# Patient Record
Sex: MALE | Race: WHITE | NOT HISPANIC OR LATINO | Employment: FULL TIME | URBAN - METROPOLITAN AREA
[De-identification: names, ages, dates, MRNs, and addresses within clinical notes are randomized per-mention and may not be internally consistent; named-entity substitution may affect disease eponyms.]

---

## 2017-03-14 ENCOUNTER — OFFICE VISIT (OUTPATIENT)
Dept: FAMILY MEDICINE | Facility: CLINIC | Age: 32
End: 2017-03-14
Payer: COMMERCIAL

## 2017-03-14 VITALS
RESPIRATION RATE: 16 BRPM | OXYGEN SATURATION: 97 % | HEIGHT: 71 IN | TEMPERATURE: 97.9 F | HEART RATE: 70 BPM | WEIGHT: 263 LBS | SYSTOLIC BLOOD PRESSURE: 138 MMHG | DIASTOLIC BLOOD PRESSURE: 88 MMHG | BODY MASS INDEX: 36.82 KG/M2

## 2017-03-14 DIAGNOSIS — L72.3 INFECTED SEBACEOUS CYST: ICD-10-CM

## 2017-03-14 DIAGNOSIS — Z00.01 ENCOUNTER FOR ROUTINE ADULT MEDICAL EXAM WITH ABNORMAL FINDINGS: Primary | ICD-10-CM

## 2017-03-14 DIAGNOSIS — Z13.1 SCREENING FOR DIABETES MELLITUS: ICD-10-CM

## 2017-03-14 DIAGNOSIS — Z23 IMMUNIZATION DUE: ICD-10-CM

## 2017-03-14 DIAGNOSIS — L08.9 INFECTED SEBACEOUS CYST: ICD-10-CM

## 2017-03-14 DIAGNOSIS — E78.5 HYPERLIPIDEMIA LDL GOAL <160: ICD-10-CM

## 2017-03-14 PROBLEM — E66.9 NON MORBID OBESITY, UNSPECIFIED OBESITY TYPE: Status: ACTIVE | Noted: 2017-03-14

## 2017-03-14 LAB
CHOLEST SERPL-MCNC: 194 MG/DL
GLUCOSE SERPL-MCNC: 94 MG/DL (ref 70–99)
HDLC SERPL-MCNC: 53 MG/DL
LDLC SERPL CALC-MCNC: 115 MG/DL
NONHDLC SERPL-MCNC: 141 MG/DL
TRIGL SERPL-MCNC: 130 MG/DL

## 2017-03-14 PROCEDURE — 90471 IMMUNIZATION ADMIN: CPT | Performed by: PHYSICIAN ASSISTANT

## 2017-03-14 PROCEDURE — 36415 COLL VENOUS BLD VENIPUNCTURE: CPT | Performed by: PHYSICIAN ASSISTANT

## 2017-03-14 PROCEDURE — 99213 OFFICE O/P EST LOW 20 MIN: CPT | Mod: 25 | Performed by: PHYSICIAN ASSISTANT

## 2017-03-14 PROCEDURE — 99395 PREV VISIT EST AGE 18-39: CPT | Mod: 25 | Performed by: PHYSICIAN ASSISTANT

## 2017-03-14 PROCEDURE — 80061 LIPID PANEL: CPT | Performed by: PHYSICIAN ASSISTANT

## 2017-03-14 PROCEDURE — 82947 ASSAY GLUCOSE BLOOD QUANT: CPT | Performed by: PHYSICIAN ASSISTANT

## 2017-03-14 PROCEDURE — 90636 HEP A/HEP B VACC ADULT IM: CPT | Performed by: PHYSICIAN ASSISTANT

## 2017-03-14 PROCEDURE — 10060 I&D ABSCESS SIMPLE/SINGLE: CPT | Performed by: PHYSICIAN ASSISTANT

## 2017-03-14 RX ORDER — CEPHALEXIN 500 MG/1
500 CAPSULE ORAL 3 TIMES DAILY
Qty: 30 CAPSULE | Refills: 0 | Status: SHIPPED | OUTPATIENT
Start: 2017-03-14 | End: 2017-03-21

## 2017-03-14 ASSESSMENT — PAIN SCALES - GENERAL: PAINLEVEL: NO PAIN (0)

## 2017-03-14 NOTE — LETTER
[facility]  [address] [place] [address]  [phone]        March 27, 2017      Noé Knight  [address]  [place] [address]        Dear Noé,      Your recent lipid profile came back within normal limits. Continue to work on a Lower fat, higher fiber diet and consistent exercise.   Your blood sugar also came back normal.     Results for orders placed or performed in visit on 03/14/17   LIPID REFLEX TO DIRECT LDL PANEL   Result Value Ref Range    Cholesterol 194 <200 mg/dL    Triglycerides 130 <150 mg/dL    HDL Cholesterol 53 >39 mg/dL    LDL Cholesterol Calculated 115 (H) <100 mg/dL    Non HDL Cholesterol 141 (H) <130 mg/dL   Glucose   Result Value Ref Range    Glucose 94 70 - 99 mg/dL     If you have any questions or concerns, please call myself or my nurse at [phone].    Sincerely,    Olivier Shea PA-C/sheela

## 2017-03-14 NOTE — PROGRESS NOTES
SUBJECTIVE:     CC: Noé Knight is an 31 year old male who presents for preventative health visit.     Healthy Habits:    Do you get at least three servings of calcium containing foods daily (dairy, green leafy vegetables, etc.)? yes    Amount of exercise or daily activities, outside of work: 3-5 day(s) per week    Problems taking medications regularly not applicable    Medication side effects: No    Have you had an eye exam in the past two years? no    Do you see a dentist twice per year? yes    Do you have sleep apnea, excessive snoring or daytime drowsiness?no      Concerns: blood pressure elevated today.  Possible infected cyst on back.       Today's PHQ-2 Score:   PHQ-2 ( 1999 Pfizer) 3/14/2017 12/18/2015   Q1: Little interest or pleasure in doing things 0 0   Q2: Feeling down, depressed or hopeless 0 0   PHQ-2 Score 0 0       Abuse: Current or Past(Physical, Sexual or Emotional)- No  Do you feel safe in your environment - Yes    Social History   Substance Use Topics     Smoking status: Never Smoker     Smokeless tobacco: Not on file     Alcohol use 0.0 oz/week     0 Standard drinks or equivalent per week      Comment: social use only     The patient does not drink >3 drinks per day nor >7 drinks per week.    Last PSA: No results found for: PSA    Recent Labs   Lab Test  12/18/15   0828   CHOL  193   HDL  48   LDL  124*   TRIG  106   NHDL  145*       Reviewed orders with patient. Reviewed health maintenance and updated orders accordingly - Yes    Reviewed and updated as needed this visit by clinical staff  Tobacco  Allergies  Meds  Problems  Med Hx  Surg Hx  Fam Hx  Soc Hx          Reviewed and updated as needed this visit by Provider  Tobacco  Allergies  Meds  Problems  Med Hx  Surg Hx  Fam Hx  Soc Hx         Past Medical History   Diagnosis Date     Esophageal stricture      endoscopy with bougie dilatation     Seasonal allergies       Past Surgical History   Procedure Laterality Date  "    [place] teeth       Open reduction internal fixation rodding intramedullar femur fracture table         ROS:  C: NEGATIVE for fever, chills, change in weight  I: NEGATIVE for worrisome rashes, moles or lesions  E: NEGATIVE for vision changes or irritation  ENT: NEGATIVE for ear, mouth and throat problems  R: NEGATIVE for significant cough or SOB  CV: NEGATIVE for chest pain, palpitations or peripheral edema  GI: NEGATIVE for nausea, abdominal pain, heartburn, or change in bowel habits   male: negative for dysuria, hematuria, decreased urinary stream, erectile dysfunction, urethral discharge  M: NEGATIVE for significant arthralgias or myalgia  N: NEGATIVE for weakness, dizziness or paresthesias  P: NEGATIVE for changes in mood or affect    Problem list, Medication list, Allergies, and Medical/Social/Surgical histories reviewed in [facility] and updated as appropriate.  BP Readings from Last 3 Encounters:   03/14/17 138/88   12/18/15 136/86    Wt Readings from Last 3 Encounters:   03/14/17 263 lb (119.3 kg)   12/18/15 262 lb (118.8 kg)                  OBJECTIVE:     /88 (BP Location: Left arm, Patient Position: Chair, Cuff Size: Adult Large)  Pulse 70  Temp 97.9  F (36.6  C) (Tympanic)  Resp 16  Ht 5' 11\" (1.803 m)  Wt 263 lb (119.3 kg)  SpO2 97%  BMI 36.68 kg/m2  EXAM:  GENERAL: healthy, alert and no distress  EYES: Eyes grossly normal to inspection, PERRL and conjunctivae and sclerae normal  HENT: ear canals and TM's normal, nose and mouth without ulcers or lesions  NECK: no adenopathy, no asymmetry, masses, or scars and thyroid normal to palpation  RESP: lungs clear to auscultation - no rales, rhonchi or wheezes  CV: regular rate and rhythm, normal S1 S2, no S3 or S4, no murmur, click or rub, no peripheral edema and peripheral pulses strong  ABDOMEN: soft, nontender, no hepatosplenomegaly, no masses and bowel sounds normal  MS: no gross musculoskeletal defects noted, no edema  SKIN: no suspicious lesions or " "rashes  NEURO: Normal strength and tone, mentation intact and speech normal  PSYCH: mentation appears normal, affect normal/bright  3cm x 2.5cm sebaceous cyst on mid back. Firm to slightly fluctuant. Redness and tenderness noted .  After consent was obtained, using sterile technique the overlying skin was prepped and 1% Lidocaine was used as local anesthetic. Using an #11 blade, a 1cm incision was made producing a moderate amount of purulent material. A curved forceps was used to break up loculations.  The abscess space was then irrigated until clear with normal saline. Area was bandaged. Discussed aftercare    ASSESSMENT/PLAN:         ICD-10-CM    1. Encounter for routine adult medical exam with abnormal findings Z00.01    2. Hyperlipidemia LDL goal <160 E78.5 LIPID REFLEX TO DIRECT LDL PANEL   3. Immunization due Z23 HEPA/HEPB VACCINE ADULT IM   4. Screening for diabetes mellitus Z13.1 Glucose   5. Infected sebaceous cyst L72.3 DRAIN SKIN ABSCESS SIMPLE/SINGLE    L08.9 cephALEXin (KEFLEX) 500 MG capsule       COUNSELING:  Reviewed preventive health counseling, as reflected in patient instructions       Regular exercise       Healthy diet/nutrition       reports that he has never smoked. He does not have any smokeless tobacco history on file.    Estimated body mass index is 36.68 kg/(m^2) as calculated from the following:    Height as of this encounter: 5' 11\" (1.803 m).    Weight as of this encounter: 263 lb (119.3 kg).   Weight management plan: Discussed healthy diet and exercise guidelines and patient will follow up in 12 months in clinic to re-evaluate.    Counseling Resources:  ATP IV Guidelines  Pooled Cohorts Equation Calculator  FRAX Risk Assessment  ICSI Preventive Guidelines  Dietary Guidelines for Americans, 2010  USDA's MyPlate  ASA Prophylaxis  Lung CA Screening    Olivier Shea PA-C  [facility] [place]  "

## 2017-03-14 NOTE — MR AVS SNAPSHOT
After Visit Summary   3/14/2017    Noé Knight    MRN: [id number]           Patient Information     Date Of Birth          1985        Visit Information        Provider Department      3/14/2017 7:40 AM Olivier Shea PA-C [facility] [place]        Today's Diagnoses     Encounter for routine adult medical exam with abnormal findings    -  1    Hyperlipidemia LDL goal <160        Immunization due        Screening for diabetes mellitus        Infected sebaceous cyst          Care Instructions      Preventive Health Recommendations  Male Ages 26 - 39    Yearly exam:             See your health care provider every year in order to  o   Review health changes.   o   Discuss preventive care.    o   Review your medicines if your doctor has prescribed any.    You should be tested each year for STDs (sexually transmitted diseases), if you re at risk.     After age 35, talk to your provider about cholesterol testing. If you are at risk for heart disease, have your cholesterol tested at least every 5 years.     If you are at risk for diabetes, you should have a diabetes test (fasting glucose).  Shots: Get a flu shot each year. Get a tetanus shot every 10 years.     Nutrition:    Eat at least 5 servings of fruits and vegetables daily.     Eat whole-grain bread, whole-wheat pasta and brown rice instead of white grains and rice.     Talk to your provider about Calcium and Vitamin D.     Lifestyle    Exercise for at least 150 minutes a week (30 minutes a day, 5 days a week). This will help you control your weight and prevent disease.     Limit alcohol to one drink per day.     No smoking.     Wear sunscreen to prevent skin cancer.     See your dentist every six months for an exam and cleaning.           Follow-ups after your visit        Your next 10 appointments already scheduled     Mar 21, 2017 10:20 AM CDT   Office Visit with FLAQUITO Modi ([facility]  "[place]    [address]  [place] [address] [phone]           Bring a current list of meds and any records pertaining to this visit.  For Physicals, please bring immunization records and any forms needing to be filled out.  Please arrive 10 minutes early to complete paperwork.              Who to contact     Normal or non-critical lab and imaging results will be communicated to you by [email or web address], letter or phone within 4 business days after the clinic has received the results. If you do not hear from us within 7 days, please contact the clinic through [email or web address] or phone. If you have a critical or abnormal lab result, we will notify you by phone as soon as possible.  Submit refill requests through [employer] or call your pharmacy and they will forward the refill request to us. Please allow 3 business days for your refill to be completed.          If you need to speak with a  for additional information , please call: [phone]             Additional Information About Your Visit        [employer] Information     [employer] lets you send messages to your doctor, view your test results, renew your prescriptions, schedule appointments and more. To sign up, go to [email or web address] . Click on \"Log in\" on the left side of the screen, which will take you to the Welcome page. Then click on \"Sign up Now\" on the right side of the page.     You will be asked to enter the access code listed below, as well as some personal information. Please follow the directions to create your username and password.     Your access code is: [email or web address]  Expires: 2017  6:43 AM     Your access code will  in 90 days. If you need help or a new code, please call your [place] clinic or [phone].        Care EveryWhere ID     This is your Care EveryWhere ID. This could be used by other organizations to access your [place] medical records  [id number]        Your Vitals Were     Pulse Temperature " "Respirations Height Pulse Oximetry BMI (Body Mass Index)    70 97.9  F (36.6  C) (Tympanic) 16 5' 11\" (1.803 m) 97% 36.68 kg/m2       Blood Pressure from Last 3 Encounters:   03/14/17 138/88   12/18/15 136/86    Weight from Last 3 Encounters:   03/14/17 263 lb (119.3 kg)   12/18/15 262 lb (118.8 kg)              We Performed the Following     DRAIN SKIN ABSCESS SIMPLE/SINGLE     Glucose     HEPA/HEPB VACCINE ADULT IM     LIPID REFLEX TO DIRECT LDL PANEL          Today's Medication Changes          These changes are accurate as of: 3/14/17 11:59 PM.  If you have any questions, ask your nurse or doctor.               Start taking these medicines.        Dose/Directions    cephALEXin 500 MG capsule   Commonly known as:  KEFLEX   Used for:  Infected sebaceous cyst   Started by:  Olivier Shea PA-C        Dose:  500 mg   Take 1 capsule (500 mg) by mouth 3 times daily   Quantity:  30 capsule   Refills:  0            Where to get your medicines      These medications were sent to [facility] [place]  [place]  [address] [address] [address]     Phone:  [phone]     cephALEXin 500 MG capsule                Primary Care Provider Office Phone #    [facility] [phone]       [facility] [id number] [place] [address] [address]  [place] [address]        Thank you!     Thank you for choosing [facility]  for your care. Our goal is always to provide you with excellent care. Hearing back from our patients is one way we can continue to improve our services. Please take a few minutes to complete the written survey that you may receive in the mail after your visit with us. Thank you!             Your Updated Medication List - Protect others around you: Learn how to safely use, store and throw away your medicines at www.disposemymeds.org.          This list is accurate as of: 3/14/17 11:59 PM.  Always use your most recent med list.                   Brand Name Dispense " Instructions for use    cephALEXin 500 MG capsule    KEFLEX    30 capsule    Take 1 capsule (500 mg) by mouth 3 times daily

## 2017-03-21 ENCOUNTER — OFFICE VISIT (OUTPATIENT)
Dept: FAMILY MEDICINE | Facility: CLINIC | Age: 32
End: 2017-03-21
Payer: COMMERCIAL

## 2017-03-21 VITALS
BODY MASS INDEX: 36.82 KG/M2 | WEIGHT: 263 LBS | HEART RATE: 65 BPM | HEIGHT: 71 IN | DIASTOLIC BLOOD PRESSURE: 84 MMHG | SYSTOLIC BLOOD PRESSURE: 136 MMHG

## 2017-03-21 DIAGNOSIS — L91.8 SKIN TAG: Primary | ICD-10-CM

## 2017-03-21 PROCEDURE — 11200 RMVL SKIN TAGS UP TO&INC 15: CPT | Performed by: PHYSICIAN ASSISTANT

## 2017-03-21 NOTE — MR AVS SNAPSHOT
"              After Visit Summary   3/21/2017    Noé Knight    MRN: [id number]           Patient Information     Date Of Birth          1985        Visit Information        Provider Department      3/21/2017 10:20 AM Olivier Shea PA-C [facility] [place]        Today's Diagnoses     Skin tag    -  1       Follow-ups after your visit        Who to contact     Normal or non-critical lab and imaging results will be communicated to you by Pocketbookhart, letter or phone within 4 business days after the clinic has received the results. If you do not hear from us within 7 days, please contact the clinic through MyChart or phone. If you have a critical or abnormal lab result, we will notify you by phone as soon as possible.  Submit refill requests through [employer] or call your pharmacy and they will forward the refill request to us. Please allow 3 business days for your refill to be completed.          If you need to speak with a  for additional information , please call: [phone]             Additional Information About Your Visit        Qulsar Information     Qulsar lets you send messages to your doctor, view your test results, renew your prescriptions, schedule appointments and more. To sign up, go to [email or web address] . Click on \"Log in\" on the left side of the screen, which will take you to the Welcome page. Then click on \"Sign up Now\" on the right side of the page.     You will be asked to enter the access code listed below, as well as some personal information. Please follow the directions to create your username and password.     Your access code is: [email or web address]  Expires: 2017  6:43 AM     Your access code will  in 90 days. If you need help or a new code, please call your [place] clinic or [phone].        Care EveryWhere ID     This is your Care EveryWhere ID. This could be used by other organizations to access your [place] medical " "records  [id number]        Your Vitals Were     Pulse Height BMI (Body Mass Index)             65 5' 11\" (1.803 m) 36.68 kg/m2          Blood Pressure from Last 3 Encounters:   03/21/17 136/84   03/14/17 138/88   12/18/15 136/86    Weight from Last 3 Encounters:   03/21/17 263 lb (119.3 kg)   03/14/17 263 lb (119.3 kg)   12/18/15 262 lb (118.8 kg)              We Performed the Following     REMOVAL OF SKIN TAGS, FIRST 15          Today's Medication Changes          These changes are accurate as of: 3/21/17 11:59 PM.  If you have any questions, ask your nurse or doctor.               Stop taking these medicines if you haven't already. Please contact your care team if you have questions.     cephALEXin 500 MG capsule   Commonly known as:  KEFLEX   Stopped by:  Olivier Shea PA-C                    Primary Care Provider Office Phone # Fax #    Olivier Shea PA-C [phone]       [facility] [address] [facility] [address]        Thank you!     Thank you for choosing [facility]  for your care. Our goal is always to provide you with excellent care. Hearing back from our patients is one way we can continue to improve our services. Please take a few minutes to complete the written survey that you may receive in the mail after your visit with us. Thank you!             Your Updated Medication List - Protect others around you: Learn how to safely use, store and throw away your medicines at www.disposemymeds.org.      Notice  As of 3/21/2017 11:59 PM    You have not been prescribed any medications.      "

## 2017-03-21 NOTE — PROGRESS NOTES
SUBJECTIVE:                                                    Noé Knight is a 31 year old male who presents to clinic today for the following health issues:      Lesion removal-noted near groin area. No bleeding or peeling noted        Problem list and histories reviewed & adjusted, as indicated.  Additional history: as documented        Reviewed and updated as needed this visit by clinical staff  Tobacco  Allergies  Meds  Problems  Med Hx  Surg Hx  Fam Hx  Soc Hx        Reviewed and updated as needed this visit by Provider         All other systems negative except as outline above  OBJECTIVE:  Papillomatous skin tag noted perineal area.   Skin tags are snipped off using alcohol for cleansing and sterile iris scissors. Local anesthesia was used. These pathognomonic lesions are not sent for pathology. Site of excision cauterized with a hand held cautery tool.  Bandage applied.    Noé was seen today for lesion removal.    Diagnoses and all orders for this visit:    Skin tag  -     REMOVAL OF SKIN TAGS, FIRST 15      Advised supportive and symptomatic treatment.  Follow up with Provider - if condition persists or worsens.

## 2017-09-11 ENCOUNTER — OFFICE VISIT (OUTPATIENT)
Dept: FAMILY MEDICINE | Facility: CLINIC | Age: 32
End: 2017-09-11
Payer: COMMERCIAL

## 2017-09-11 VITALS
SYSTOLIC BLOOD PRESSURE: 134 MMHG | HEART RATE: 56 BPM | OXYGEN SATURATION: 99 % | BODY MASS INDEX: 37.82 KG/M2 | DIASTOLIC BLOOD PRESSURE: 74 MMHG | TEMPERATURE: 98.3 F | WEIGHT: 271.2 LBS

## 2017-09-11 DIAGNOSIS — K21.9 GASTROESOPHAGEAL REFLUX DISEASE, ESOPHAGITIS PRESENCE NOT SPECIFIED: ICD-10-CM

## 2017-09-11 DIAGNOSIS — Z23 ENCOUNTER FOR IMMUNIZATION: ICD-10-CM

## 2017-09-11 DIAGNOSIS — R07.0 THROAT PAIN: Primary | ICD-10-CM

## 2017-09-11 LAB
DEPRECATED S PYO AG THROAT QL EIA: NORMAL
SPECIMEN SOURCE: NORMAL

## 2017-09-11 PROCEDURE — 90746 HEPB VACCINE 3 DOSE ADULT IM: CPT | Performed by: NURSE PRACTITIONER

## 2017-09-11 PROCEDURE — 87880 STREP A ASSAY W/OPTIC: CPT | Performed by: NURSE PRACTITIONER

## 2017-09-11 PROCEDURE — 87081 CULTURE SCREEN ONLY: CPT | Performed by: NURSE PRACTITIONER

## 2017-09-11 PROCEDURE — 90471 IMMUNIZATION ADMIN: CPT | Performed by: NURSE PRACTITIONER

## 2017-09-11 PROCEDURE — 99213 OFFICE O/P EST LOW 20 MIN: CPT | Mod: 25 | Performed by: NURSE PRACTITIONER

## 2017-09-11 NOTE — NURSING NOTE
"Chief Complaint   Patient presents with     Ent Problem     Imm/Inj       Initial BP (!) 146/93  Pulse 56  Temp 98.3  F (36.8  C) (Oral)  Wt 271 lb 3.2 oz (123 kg)  SpO2 99%  BMI 37.82 kg/m2 Estimated body mass index is 37.82 kg/(m^2) as calculated from the following:    Height as of 3/21/17: 5' 11\" (1.803 m).    Weight as of this encounter: 271 lb 3.2 oz (123 kg).  Medication Reconciliation: complete     Talita Trent MA  "

## 2017-09-11 NOTE — MR AVS SNAPSHOT
After Visit Summary   9/11/2017    Noé Knight    MRN: [id number]           Patient Information     Date Of Birth          1985        Visit Information        Provider Department      9/11/2017 9:00 AM Maria Tate NP [facility]        Today's Diagnoses     Throat pain    -  1    Gastroesophageal reflux disease, esophagitis presence not specified          Care Instructions      Tips to Control Acid Reflux    To control acid reflux, you ll need to make some basic diet and lifestyle changes. The simple steps outlined below may be all you ll need to ease discomfort.  Watch what you eat    Avoid fatty foods and spicy foods.    Eat fewer acidic foods, such as citrus and tomato-based foods. These can increase symptoms.    Limit drinking alcohol, caffeine, and fizzy beverages. All increase acid reflux.    Try limiting chocolate, peppermint, and spearmint. These can worsen acid reflux in some people.  Watch when you eat    Avoid lying down for 3 hours after eating.    Do not snack before going to bed.  Raise your head  Raising your head and upper body by 4 to 6 inches helps limit reflux when you re lying down. Put blocks under the head of your bed frame to raise it.  Other changes    Lose weight, if you need to    Don t exercise near bedtime    Avoid tight-fitting clothes    Limit aspirin and ibuprofen    Stop smoking   Date Last Reviewed: 7/1/2016 2000-2017 [employer]. [address] [place] [address], [place] [address]. All rights reserved. This information is not intended as a substitute for professional medical care. Always follow your healthcare professional's instructions.                Follow-ups after your visit        Follow-up notes from your care team     Return if symptoms worsen or fail to improve.      Who to contact     Normal or non-critical lab and imaging results will be communicated to you by MyChart, letter or phone within 4 business days after the  clinic has received the results. If you do not hear from us within 7 days, please contact the clinic through [employer] or phone. If you have a critical or abnormal lab result, we will notify you by phone as soon as possible.  Submit refill requests through [employer] or call your pharmacy and they will forward the refill request to us. Please allow 3 business days for your refill to be completed.          If you need to speak with a  for additional information , please call: [phone]             Additional Information About Your Visit        Care EveryWhere ID     This is your Care EveryWhere ID. This could be used by other organizations to access your [place] medical records  [id number]        Your Vitals Were     Pulse Temperature Pulse Oximetry BMI (Body Mass Index)          56 98.3  F (36.8  C) (Oral) 99% 37.82 kg/m2         Blood Pressure from Last 3 Encounters:   09/11/17 (!) 146/93   03/21/17 136/84   03/14/17 138/88    Weight from Last 3 Encounters:   09/11/17 271 lb 3.2 oz (123 kg)   03/21/17 263 lb (119.3 kg)   03/14/17 263 lb (119.3 kg)              We Performed the Following     Beta strep group A culture     Strep, Rapid Screen          Today's Medication Changes          These changes are accurate as of: 9/11/17  9:10 AM.  If you have any questions, ask your nurse or doctor.               Start taking these medicines.        Dose/Directions    omeprazole 20 MG CR capsule   Commonly known as:  priLOSEC   Used for:  Gastroesophageal reflux disease, esophagitis presence not specified, Throat pain   Started by:  Maria Tate NP        Dose:  20 mg   Take 1 capsule (20 mg) by mouth daily   Quantity:  90 capsule   Refills:  1            Where to get your medicines      These medications were sent to [facility] [place] - [address]  [address] [facility] [place] [address]     Phone:  [phone]     omeprazole 20 MG CR capsule                Primary  Care Provider Office Phone # Fax #    Olivier Shea PA-C [phone]       [address]  [facility] [address]        Equal Access to Services     GODFREY TAYLOR : Hadii aad ku hadasho Soomaali, waaxda luqadaha, qaybta kaalmada adeegyada, waxjuanis idiin bernien adezehra trejo laYukovaughn schmitt. So [facility] [phone].    ATENCIÓN: Si habla español, tiene a robertson disposición servicios gratuitos de asistencia lingüística. Llame al [phone].    We comply with applicable federal civil rights laws and [place] laws. We do not discriminate on the basis of race, color, national origin, age, disability sex, sexual orientation or gender identity.            Thank you!     Thank you for choosing [facility]  for your care. Our goal is always to provide you with excellent care. Hearing back from our patients is one way we can continue to improve our services. Please take a few minutes to complete the written survey that you may receive in the mail after your visit with us. Thank you!             Your Updated Medication List - Protect others around you: Learn how to safely use, store and throw away your medicines at www.disposemymeds.org.          This list is accurate as of: 9/11/17  9:10 AM.  Always use your most recent med list.                   Brand Name Dispense Instructions for use Diagnosis    omeprazole 20 MG CR capsule    priLOSEC    90 capsule    Take 1 capsule (20 mg) by mouth daily    Gastroesophageal reflux disease, esophagitis presence not specified, Throat pain

## 2017-09-11 NOTE — PATIENT INSTRUCTIONS
Tips to Control Acid Reflux    To control acid reflux, you ll need to make some basic diet and lifestyle changes. The simple steps outlined below may be all you ll need to ease discomfort.  Watch what you eat    Avoid fatty foods and spicy foods.    Eat fewer acidic foods, such as citrus and tomato-based foods. These can increase symptoms.    Limit drinking alcohol, caffeine, and fizzy beverages. All increase acid reflux.    Try limiting chocolate, peppermint, and spearmint. These can worsen acid reflux in some people.  Watch when you eat    Avoid lying down for 3 hours after eating.    Do not snack before going to bed.  Raise your head  Raising your head and upper body by 4 to 6 inches helps limit reflux when you re lying down. Put blocks under the head of your bed frame to raise it.  Other changes    Lose weight, if you need to    Don t exercise near bedtime    Avoid tight-fitting clothes    Limit aspirin and ibuprofen    Stop smoking   Date Last Reviewed: 7/1/2016 2000-2017 [employer]. [address] [place] [address], [place] [address]. All rights reserved. This information is not intended as a substitute for professional medical care. Always follow your healthcare professional's instructions.

## 2017-09-11 NOTE — PROGRESS NOTES
SUBJECTIVE:  Noé Knight  is a 31 year old  male  who presents with the following concerns;              Symptoms: Present Comment   Fever/Chills     Fatigue     Muscle Aches     Eye Irritation     Sneezing     Nasal Peter/Drg x    Sinus Pressure/Pain     Loss of smell     Dental pain     Sore Throat x intermittent   Swollen Glands     Ear Pain/Fullness     Cough     Wheeze     Chest Pain     Shortness of breath     Rash     Other       Symptom duration:  2 weeks   Sympom severity:  mild-mod   Treatments tried:  none   Contacts:  none       Medications updated and reviewed.  Past, family and surgical history is updated and reviewed in the record.  Patient Active Problem List    Diagnosis Date Noted     Non morbid obesity, unspecified obesity type 03/14/2017     Priority: Medium     Hyperlipidemia LDL goal <160 12/18/2015     Priority: Medium     Past Medical History:   Diagnosis Date     Esophageal stricture     endoscopy with bougie dilatation     Seasonal allergies       Family History   Problem Relation Age of Onset     Hypertension Father      Colon Cancer Maternal Grandmother      ROS:  Other than noted above, general, HEENT, respiratory, cardiac and gastrointestinal systems are negative.    OBJECTIVE:  GENERAL:  Alert, no acute distress  EYES:  PERRL, EOM normal, conjunctiva and lids normal  HEENT:  Ears and TMs normal, oral mucosa POSITIVE erythematous, edematous posterior oropharynx normal  RESP:  Lungs clear to auscultation.  CV:  Normal rate, regular rhythm, no murmur or gallop.  LYMPHATICS:  No cervical, supraclavicular adenopathy  NEURO:  Alert, oriented, speech and mentation normal    Assessment/Plan:     ICD-10-CM    1. Throat pain R07.0 Strep, Rapid Screen     Beta strep group A culture     omeprazole (PRILOSEC) 20 MG CR capsule   2. Gastroesophageal reflux disease, esophagitis presence not specified K21.9 omeprazole (PRILOSEC) 20 MG CR capsule   3. Encounter for immunization Z23 HEPATITIS B  VACCINE, ADULT, IM     ADMIN 1st VACCINE        See patient instructions    Maria Tate, APRN, FNP-BC

## 2017-09-11 NOTE — NURSING NOTE
Screening Questionnaire for Adult Immunization    Are you sick today?   No   Do you have allergies to medications, food, a vaccine component or latex?   No   Have you ever had a serious reaction after receiving a vaccination?   No   Do you have a long-term health problem with heart disease, lung disease, asthma, kidney disease, metabolic disease (e.g. diabetes), anemia, or other blood disorder?   No   Do you have cancer, leukemia, HIV/AIDS, or any other immune system problem?   No   In the past 3 months, have you taken medications that affect  your immune system, such as prednisone, other steroids, or anticancer drugs; drugs for the treatment of rheumatoid arthritis, Crohn s disease, or psoriasis; or have you had radiation treatments?   No   Have you had a seizure, or a brain or other nervous system problem?   No   During the past year, have you received a transfusion of blood or blood     products, or been given immune (gamma) globulin or antiviral drug?   No   For women: Are you pregnant or is there a chance you could become        pregnant during the next month?   No   Have you received any vaccinations in the past 4 weeks?   No     Immunization questionnaire answers were all negative.        Per orders of MEDHAT Perez, FNP-BC , injection of hep b given by Talita Trent. Patient instructed to remain in clinic for 15 minutes afterwards, and to report any adverse reaction to me immediately.       Screening performed by Talita Trent on 9/11/2017 at 9:15 AM.

## 2017-09-12 LAB
BACTERIA SPEC CULT: NORMAL
SPECIMEN SOURCE: NORMAL

## 2017-12-12 ENCOUNTER — OFFICE VISIT (OUTPATIENT)
Dept: FAMILY MEDICINE | Facility: CLINIC | Age: 32
End: 2017-12-12
Payer: COMMERCIAL

## 2017-12-12 VITALS
HEART RATE: 80 BPM | DIASTOLIC BLOOD PRESSURE: 86 MMHG | SYSTOLIC BLOOD PRESSURE: 134 MMHG | WEIGHT: 274 LBS | BODY MASS INDEX: 38.22 KG/M2 | TEMPERATURE: 97.9 F

## 2017-12-12 DIAGNOSIS — K21.9 GASTROESOPHAGEAL REFLUX DISEASE, ESOPHAGITIS PRESENCE NOT SPECIFIED: Primary | ICD-10-CM

## 2017-12-12 DIAGNOSIS — Z23 NEED FOR PROPHYLACTIC VACCINATION AND INOCULATION AGAINST INFLUENZA: ICD-10-CM

## 2017-12-12 PROCEDURE — 90686 IIV4 VACC NO PRSV 0.5 ML IM: CPT | Performed by: NURSE PRACTITIONER

## 2017-12-12 PROCEDURE — 90471 IMMUNIZATION ADMIN: CPT | Performed by: NURSE PRACTITIONER

## 2017-12-12 PROCEDURE — 99213 OFFICE O/P EST LOW 20 MIN: CPT | Mod: 25 | Performed by: NURSE PRACTITIONER

## 2017-12-12 NOTE — PATIENT INSTRUCTIONS
Lifestyle Changes for Controlling GERD  When you have GERD, stomach acid feels as if it s backing up toward your mouth. Whether or not you take medicine to control your GERD, your symptoms can often be improved with lifestyle changes. Talk to your healthcare provider about the following suggestions. These suggestions may help you get relief from your symptoms.      Raise your head  Reflux is more likely to strike when you re lying down flat, because stomach fluid can flow backward more easily. Raising the head of your bed 4 to 6 inches can help. To do this:    Slide blocks or books under the legs at the head of your bed. Or, place a wedge under the mattress. Many [employer] can make a suitable wedge for you. The wedge should run from your waist to the top of your head.    Don t just prop your head on several pillows. This increases pressure on your stomach. It can make GERD worse.  Watch your eating habits  Certain foods may increase the acid in your stomach or relax the lower esophageal sphincter. This makes GERD more likely. It s best to avoid the following if they cause you symptoms:    Coffee, tea, and carbonated drinks (with and without caffeine)    Fatty, fried, or spicy food    Mint, chocolate, onions, and tomatoes    Peppermint    Any other foods that seem to irritate your stomach or cause you pain  Relieve the pressure  Tips include the following:    Eat smaller meals, even if you have to eat more often.    Don t lie down right after you eat. Wait a few hours for your stomach to empty.    Avoid tight belts and tight-fitting clothes.    Lose excess weight.  Tobacco and alcohol  Avoid smoking tobacco and drinking alcohol. They can make GERD symptoms worse.  Date Last Reviewed: 7/1/2016 2000-2017 [employer]. [address] [place] [address]. All rights reserved. This information is not intended as a substitute for professional medical care. Always follow your healthcare  professional's instructions.        Medicines for GERD  Gastroesophageal reflux disease (GERD) can be treated with medicine. This may be done with a medicine you can buy over the counter. Or it may be done with a medicine that your healthcare provider has to prescribe. In some cases, both types may be used. Your provider will tell you what is best for your symptoms.  Antacids  Antacids work to weaken the acid in your stomach and can give you quick relief. You can buy many of them with no prescription. Antacids can be high in sodium. This may be a problem if you have high blood pressure. Some antacids also have aluminum. This should be avoided if you have long-term (chronic) kidney disease. So check with your provider first. Take antacids only when you need to, as advised by your provider.  Side effects: Constipation, diarrhea. If you take too much medicine, it can cause calcium to build up.   H-2 blockers  H-2 blockers cause the stomach to make less acid. They are often used both on demand as symptoms occur, and daily to keep symptoms away. Your provider may prescribe them if antacids don t work for you. You can buy some of them over the counter. These come in a lower dosage.  Side effects: Confusion in older adults  Proton-pump inhibitors  These also cause the stomach to make less acid. They reduce stomach acid more than H-2 blockers. They may be used for a short time, or longer to treat certain conditions. You can buy some of them over the counter. Or your provider may prescribe them. They help control GERD symptoms.  Side effects: Belly (abdominal) pain, diarrhea, upset stomach (nausea). Possible other side effects linked to long-term use and high doses.  Prokinetics  These medicines affect the movement of the digestive tract. They may be recommended if your stomach is emptying too slowly. But in most cases they are not recommended for treating GERD.   Side effects: Tiredness, depression, anxiety, problems with  physical movement, belly cramps, constipation, diarrhea, a jittery feeling  Medicines to avoid  Don t take aspirin without your provider s approval. And don t take a nonsteroidal anti-inflammatory drug (NSAID), such as ibuprofen. These reduce the protective lining of your stomach. This can lead to more GERD symptoms. Check with your provider or pharmacist before taking a new medicine.   Date Last Reviewed: 7/1/2016 2000-2017 [employer]. [address] [place] [address], [place] [id number]. All rights reserved. This information is not intended as a substitute for professional medical care. Always follow your healthcare professional's instructions.

## 2017-12-12 NOTE — NURSING NOTE
"Chief Complaint   Patient presents with     Gastrophageal Reflux       Initial /86  Pulse 80  Temp 97.9  F (36.6  C) (Oral)  Wt 274 lb (124.3 kg)  BMI 38.22 kg/m2 Estimated body mass index is 38.22 kg/(m^2) as calculated from the following:    Height as of 3/21/17: 5' 11\" (1.803 m).    Weight as of this encounter: 274 lb (124.3 kg).  Medication Reconciliation: walker Cooley CMA      "

## 2017-12-12 NOTE — PROGRESS NOTES
SUBJECTIVE:   Noé Knight is a 32 year old male who presents to clinic today for the following health issues:      GERD/Heartburn      Duration: 3 weeks     Description (location/character/radiation): burning in throat     Intensity:  moderate    Accompanying signs and symptoms:  food getting stuck: no   nausea/vomiting/blood: no   abdominal pain: no   black/tarry or bloody stools: no :    History (similar episodes/previous evaluation): None    Precipitating or alleviating factors:  worse with no particular food or drink.  current NSAID/Aspirin use: no     Therapies tried and outcome: Omeprazole (Prilosec)- medication is working well, no concerns        Problem list and histories reviewed & adjusted, as indicated.  Additional history: as documented    Patient Active Problem List   Diagnosis     Hyperlipidemia LDL goal <160     Non morbid obesity, unspecified obesity type     Past Surgical History:   Procedure Laterality Date     OPEN REDUCTION INTERNAL FIXATION RODDING INTRAMEDULLAR FEMUR FRACTURE TABLE       wisdom teeth         Social History   Substance Use Topics     Smoking status: Never Smoker     Smokeless tobacco: Never Used     Alcohol use 0.0 oz/week     0 Standard drinks or equivalent per week      Comment: social use only     Family History   Problem Relation Age of Onset     Hypertension Father      Colon Cancer Maternal Grandmother          Current Outpatient Prescriptions   Medication Sig Dispense Refill     omeprazole (PRILOSEC) 20 MG CR capsule Take 1 capsule (20 mg) by mouth daily 90 capsule 3     No Known Allergies  BP Readings from Last 3 Encounters:   12/12/17 134/86   09/11/17 134/74   03/21/17 136/84    Wt Readings from Last 3 Encounters:   12/12/17 274 lb (124.3 kg)   09/11/17 271 lb 3.2 oz (123 kg)   03/21/17 263 lb (119.3 kg)                  Labs reviewed in EPIC        Reviewed and updated as needed this visit by clinical staffTobacco  Allergies  Meds       Reviewed and  updated as needed this visit by Provider         ROS:  Constitutional, HEENT, cardiovascular, pulmonary, GI, , musculoskeletal, neuro, skin, endocrine and psych systems are negative, except as otherwise noted.      OBJECTIVE:   /86  Pulse 80  Temp 97.9  F (36.6  C) (Oral)  Wt 274 lb (124.3 kg)  BMI 38.22 kg/m2  Body mass index is 38.22 kg/(m^2).  GENERAL: healthy, alert and no distress  RESP: lungs clear to auscultation - no rales, rhonchi or wheezes  CV: regular rate and rhythm, normal S1 S2, no S3 or S4, no murmur, click or rub, no peripheral edema and peripheral pulses strong  ABDOMEN: soft, nontender, no hepatosplenomegaly, no masses and bowel sounds normal  PSYCH: mentation appears normal, affect normal/bright    Diagnostic Test Results:  none     ASSESSMENT/PLAN:         ICD-10-CM    1. Gastroesophageal reflux disease, esophagitis presence not specified K21.9 omeprazole (PRILOSEC) 20 MG CR capsule   2. Need for prophylactic vaccination and inoculation against influenza Z23 FLU VAC, SPLIT VIRUS IM > 3 YO (QUADRIVALENT) [09844]     Vaccine Administration, Initial [80835]       See Patient Instructions    Maria Tate, DEDRICK  [facility]

## 2017-12-12 NOTE — PROGRESS NOTES

## 2017-12-12 NOTE — MR AVS SNAPSHOT
After Visit Summary   12/12/2017    Noé Knight    MRN: [id number]           Patient Information     Date Of Birth          1985        Visit Information        Provider Department      12/12/2017 8:20 AM Maria Tate NP [facility]        Today's Diagnoses     Gastroesophageal reflux disease, esophagitis presence not specified        Throat pain          Care Instructions      Lifestyle Changes for Controlling GERD  When you have GERD, stomach acid feels as if it s backing up toward your mouth. Whether or not you take medicine to control your GERD, your symptoms can often be improved with lifestyle changes. Talk to your healthcare provider about the following suggestions. These suggestions may help you get relief from your symptoms.      Raise your head  Reflux is more likely to strike when you re lying down flat, because stomach fluid can flow backward more easily. Raising the head of your bed 4 to 6 inches can help. To do this:    Slide blocks or books under the legs at the head of your bed. Or, place a wedge under the mattress. Many [employer] can make a suitable wedge for you. The wedge should run from your waist to the top of your head.    Don t just prop your head on several pillows. This increases pressure on your stomach. It can make GERD worse.  Watch your eating habits  Certain foods may increase the acid in your stomach or relax the lower esophageal sphincter. This makes GERD more likely. It s best to avoid the following if they cause you symptoms:    Coffee, tea, and carbonated drinks (with and without caffeine)    Fatty, fried, or spicy food    Mint, chocolate, onions, and tomatoes    Peppermint    Any other foods that seem to irritate your stomach or cause you pain  Relieve the pressure  Tips include the following:    Eat smaller meals, even if you have to eat more often.    Don t lie down right after you eat. Wait a few hours for your stomach to  empty.    Avoid tight belts and tight-fitting clothes.    Lose excess weight.  Tobacco and alcohol  Avoid smoking tobacco and drinking alcohol. They can make GERD symptoms worse.  Date Last Reviewed: 7/1/2016 2000-2017 [employer]. [address] [place] [address], [place] [address]. All rights reserved. This information is not intended as a substitute for professional medical care. Always follow your healthcare professional's instructions.        Medicines for GERD  Gastroesophageal reflux disease (GERD) can be treated with medicine. This may be done with a medicine you can buy over the counter. Or it may be done with a medicine that your healthcare provider has to prescribe. In some cases, both types may be used. Your provider will tell you what is best for your symptoms.  Antacids  Antacids work to weaken the acid in your stomach and can give you quick relief. You can buy many of them with no prescription. Antacids can be high in sodium. This may be a problem if you have high blood pressure. Some antacids also have aluminum. This should be avoided if you have long-term (chronic) kidney disease. So check with your provider first. Take antacids only when you need to, as advised by your provider.  Side effects: Constipation, diarrhea. If you take too much medicine, it can cause calcium to build up.   H-2 blockers  H-2 blockers cause the stomach to make less acid. They are often used both on demand as symptoms occur, and daily to keep symptoms away. Your provider may prescribe them if antacids don t work for you. You can buy some of them over the counter. These come in a lower dosage.  Side effects: Confusion in older adults  Proton-pump inhibitors  These also cause the stomach to make less acid. They reduce stomach acid more than H-2 blockers. They may be used for a short time, or longer to treat certain conditions. You can buy some of them over the counter. Or your provider may prescribe them. They help  control GERD symptoms.  Side effects: Belly (abdominal) pain, diarrhea, upset stomach (nausea). Possible other side effects linked to long-term use and high doses.  Prokinetics  These medicines affect the movement of the digestive tract. They may be recommended if your stomach is emptying too slowly. But in most cases they are not recommended for treating GERD.   Side effects: Tiredness, depression, anxiety, problems with physical movement, belly cramps, constipation, diarrhea, a jittery feeling  Medicines to avoid  Don t take aspirin without your provider s approval. And don t take a nonsteroidal anti-inflammatory drug (NSAID), such as ibuprofen. These reduce the protective lining of your stomach. This can lead to more GERD symptoms. Check with your provider or pharmacist before taking a new medicine.   Date Last Reviewed: 7/1/2016 2000-2017 [employer]. [address] [place] [address]. All rights reserved. This information is not intended as a substitute for professional medical care. Always follow your healthcare professional's instructions.                Follow-ups after your visit        Follow-up notes from your care team     Return if symptoms worsen or fail to improve.      Who to contact     Normal or non-critical lab and imaging results will be communicated to you by MyChart, letter or phone within 4 business days after the clinic has received the results. If you do not hear from us within 7 days, please contact the clinic through [employer] or phone. If you have a critical or abnormal lab result, we will notify you by phone as soon as possible.  Submit refill requests through [employer] or call your pharmacy and they will forward the refill request to us. Please allow 3 business days for your refill to be completed.          If you need to speak with a  for additional information , please call: [phone]             Additional Information About Your Visit       "  MyChart Information     [employer] lets you send messages to your doctor, view your test results, renew your prescriptions, schedule appointments and more. To sign up, go to [email or web address] . Click on \"Log in\" on the left side of the screen, which will take you to the Welcome page. Then click on \"Sign up Now\" on the right side of the page.     You will be asked to enter the access code listed below, as well as some personal information. Please follow the directions to create your username and password.     Your access code is: [email or web address]  Expires: 3/12/2018  8:33 AM     Your access code will  in 90 days. If you need help or a new code, please call your [facility] clinic or [phone].        Care EveryWhere ID     This is your Care EveryWhere ID. This could be used by other organizations to access your [facility] medical records  [id number]        Your Vitals Were     Pulse Temperature BMI (Body Mass Index)             80 97.9  F (36.6  C) (Oral) 38.22 kg/m2          Blood Pressure from Last 3 Encounters:   17 134/86   17 134/74   17 136/84    Weight from Last 3 Encounters:   17 274 lb (124.3 kg)   17 271 lb 3.2 oz (123 kg)   17 263 lb (119.3 kg)              Today, you had the following     No orders found for display         Where to get your medicines      These medications were sent to [facility] - [address]  [address], [place] [address]     Phone:  [phone]     omeprazole 20 MG CR capsule          Primary Care Provider Office Phone # Fax #    Olivier Shea PA-C [phone]       [address] [place] [address]        Equal Access to Services     [facility] CLAUDIA : Ramo zayas Soisaura, waaxda luqadaha, qaybta kaalmaradha stringer, piotr grewal . So [facility] [phone].    ATENCIÓN: Si habla español, tiene a robertson disposición servicios gratuitos de " asistencia lingüística. Doni al [phone].    We comply with applicable federal civil rights laws and [place] laws. We do not discriminate on the basis of race, color, national origin, age, disability, sex, sexual orientation, or gender identity.            Thank you!     Thank you for choosing [facility]  for your care. Our goal is always to provide you with excellent care. Hearing back from our patients is one way we can continue to improve our services. Please take a few minutes to complete the written survey that you may receive in the mail after your visit with us. Thank you!             Your Updated Medication List - Protect others around you: Learn how to safely use, store and throw away your medicines at www.disposemymeds.org.          This list is accurate as of: 12/12/17  8:33 AM.  Always use your most recent med list.                   Brand Name Dispense Instructions for use Diagnosis    omeprazole 20 MG CR capsule    priLOSEC    90 capsule    Take 1 capsule (20 mg) by mouth daily    Gastroesophageal reflux disease, esophagitis presence not specified, Throat pain

## 2020-01-02 NOTE — PROGRESS NOTES
3  SUBJECTIVE:   CC: Noé Knight is an 34 year old male who presents for preventive health visit.   Patient/Parent of patient informed that anything we discuss that is not related to preventative medicine, may be billed for; patient verbalizes understanding.    Patient would still like to discuss the following concern(s):  1. Acid reflux  2. lypomas on back    Healthy Habits:    Do you get at least three servings of calcium containing foods daily (dairy, green leafy vegetables, etc.)? no    Amount of exercise or daily activities, outside of work: 3-4 day(s) per week    Problems taking medications regularly No    Medication side effects: No    Have you had an eye exam in the past two years? no    Do you see a dentist twice per year? yes    Do you have sleep apnea, excessive snoring or daytime drowsiness?no      Today's PHQ-2 Score:   PHQ-2 ( 1999 [employer]) 1/7/2020 3/14/2017   Q1: Little interest or pleasure in doing things 0 0   Q2: Feeling down, depressed or hopeless 0 0   PHQ-2 Score 0 0       Abuse: Current or Past(Physical, Sexual or Emotional)- No  Do you feel safe in your environment? Yes        Social History     Tobacco Use     Smoking status: Never Smoker     Smokeless tobacco: Never Used   Substance Use Topics     Alcohol use: Yes     Alcohol/week: 0.0 standard drinks     Comment: social use only     If you drink alcohol do you typically have >3 drinks per day or >7 drinks per week? Yes - AUDIT SCORE:     No flowsheet data found.                      Last PSA: No results found for: PSA    Reviewed orders with patient. Reviewed health maintenance and updated orders accordingly - Yes  Lab work is in process  Labs reviewed in EPIC  BP Readings from Last 3 Encounters:   01/07/20 (!) 148/88   12/12/17 134/86   09/11/17 134/74    Wt Readings from Last 3 Encounters:   01/07/20 128.5 kg (283 lb 3.2 oz)   12/12/17 124.3 kg (274 lb)   09/11/17 123 kg (271 lb 3.2 oz)                  Patient Active Problem  List   Diagnosis     Hyperlipidemia LDL goal <160     Non morbid obesity, unspecified obesity type     Obesity (BMI 35.0-39.9) with comorbidity (H)     Alcohol abuse     Gastroesophageal reflux disease, esophagitis presence not specified     Lipoma of skin and subcutaneous tissue     Past Surgical History:   Procedure Laterality Date     OPEN REDUCTION INTERNAL FIXATION RODDING INTRAMEDULLAR FEMUR FRACTURE TABLE       wisdom teeth         Social History     Tobacco Use     Smoking status: Never Smoker     Smokeless tobacco: Never Used   Substance Use Topics     Alcohol use: Yes     Alcohol/week: 0.0 standard drinks     Comment: social use only     Family History   Problem Relation Age of Onset     Hypertension Father      Colon Cancer Maternal Grandmother          Current Outpatient Medications   Medication Sig Dispense Refill     omeprazole (PRILOSEC) 20 MG DR capsule Take 1 capsule (20 mg) by mouth daily 90 capsule 3     No Known Allergies  Recent Labs   Lab Test 03/14/17  0852 12/18/15  0828   * 124*   HDL 53 48   TRIG 130 106        Reviewed and updated as needed this visit by clinical staff  Tobacco  Allergies  Meds  Problems  Med Hx  Surg Hx  Fam Hx  Soc Hx          Reviewed and updated as needed this visit by Provider  Tobacco  Allergies  Meds  Problems  Med Hx  Surg Hx  Fam Hx        Past Medical History:   Diagnosis Date     Esophageal stricture     endoscopy with bougie dilatation     Seasonal allergies       Past Surgical History:   Procedure Laterality Date     OPEN REDUCTION INTERNAL FIXATION RODDING INTRAMEDULLAR FEMUR FRACTURE TABLE       wisdom teeth         ROS:  CONSTITUTIONAL: NEGATIVE for fever, chills, change in weight  INTEGUMENTARY/SKIN: NEGATIVE for worrisome rashes, moles or lesions  EYES: NEGATIVE for vision changes or irritation  ENT: NEGATIVE for ear, mouth and throat problems  RESP: NEGATIVE for significant cough or SOB  CV: NEGATIVE for chest pain, palpitations or  "peripheral edema  GI: NEGATIVE for nausea, abdominal pain, heartburn, or change in bowel habits   male: negative for dysuria, hematuria, decreased urinary stream, erectile dysfunction, urethral discharge  MUSCULOSKELETAL: NEGATIVE for significant arthralgias or myalgia  NEURO: NEGATIVE for weakness, dizziness or paresthesias  ENDOCRINE: NEGATIVE for temperature intolerance, skin/hair changes  HEME/ALLERGY/IMMUNE: NEGATIVE for bleeding problems  PSYCHIATRIC: NEGATIVE for changes in mood or affect    OBJECTIVE:   BP (!) 148/88   Pulse 64   Temp 98.2  F (36.8  C) (Oral)   Resp 20   Ht 1.803 m (5' 11\")   Wt 128.5 kg (283 lb 3.2 oz)   SpO2 99%   BMI 39.50 kg/m    EXAM:  GENERAL: healthy, alert and no distress  EYES: Eyes grossly normal to inspection, PERRL and conjunctivae and sclerae normal  HENT: ear canals and TM's normal, nose and mouth without ulcers or lesions  NECK: no adenopathy, no asymmetry, masses, or scars and thyroid normal to palpation  RESP: lungs clear to auscultation - no rales, rhonchi or wheezes  CV: regular rate and rhythm, normal S1 S2, no S3 or S4, no murmur, click or rub, no peripheral edema and peripheral pulses strong  ABDOMEN: soft, nontender, no hepatosplenomegaly, no masses and bowel sounds normal, no CVA tenderness   (male): deferred, no concerns  MS: no gross musculoskeletal defects noted, no edema  SKIN: no suspicious lesions or rashes POSITIVE for large lipoma to left back- approx 2 inches in diameter.   NEURO: Normal strength and tone, cranial nerves intact, mentation intact and speech normal  PSYCH: mentation appears normal, affect normal/bright  LYMPH: no cervical, supraclavicular, axillary adenopathy    Diagnostic Test Results:  Labs reviewed in Epic  See orders    ASSESSMENT/PLAN:       ICD-10-CM    1. Routine general medical examination at a health care facility Z00.00    2. Lipoma of skin and subcutaneous tissue D17.30 GENERAL SURG ADULT REFERRAL   3. Gastroesophageal " "reflux disease, esophagitis presence not specified K21.9 omeprazole (PRILOSEC) 20 MG DR capsule   4. Lipid screening Z13.220 Lipid panel reflex to direct LDL Fasting   5. Screening for human immunodeficiency virus without presence of risk factors Z11.4 HIV Screening   6. Morbid obesity (H) E66.01    7. Screening for diabetes mellitus Z13.1 Glucose   8. Screening for thyroid disorder Z13.29 TSH with free T4 reflex   9. Alcohol abuse F10.10        COUNSELING:  Reviewed preventive health counseling, as reflected in patient instructions    Estimated body mass index is 39.5 kg/m  as calculated from the following:    Height as of this encounter: 1.803 m (5' 11\").    Weight as of this encounter: 128.5 kg (283 lb 3.2 oz).    Weight management plan: Discussed healthy diet and exercise guidelines     reports that he has never smoked. He has never used smokeless tobacco.      Counseling Resources:  ATP IV Guidelines  Pooled Cohorts Equation Calculator  FRAX Risk Assessment  ICSI Preventive Guidelines  Dietary Guidelines for Americans, 2010  USDA's MyPlate  ASA Prophylaxis  Lung CA Screening    DEDRICK Ruiz  [facility]IS[facility]  "

## 2020-01-02 NOTE — PATIENT INSTRUCTIONS
Reminders:     Please remember to arrive 5-10 minutes early for your appointments. If you are late you may need to reschedule your appointment.    If you have mychart please be aware your results and communications will be sent within your mychart. Unless results are critical and/or urgent value.       Preventive Health Recommendations  Male Ages 26 - 39    Yearly exam:             See your health care provider every year in order to  o   Review health changes.   o   Discuss preventive care.    o   Review your medicines if your doctor has prescribed any.    You should be tested each year for STDs (sexually transmitted diseases), if you re at risk.     After age 35, talk to your provider about cholesterol testing. If you are at risk for heart disease, have your cholesterol tested at least every 5 years.     If you are at risk for diabetes, you should have a diabetes test (fasting glucose).  Shots: Get a flu shot each year. Get a tetanus shot every 10 years.     Nutrition:    Eat at least 5 servings of fruits and vegetables daily.     Eat whole-grain bread, whole-wheat pasta and brown rice instead of white grains and rice.     Get adequate Calcium and Vitamin D.     Lifestyle    Exercise for at least 150 minutes a week (30 minutes a day, 5 days a week). This will help you control your weight and prevent disease.     Limit alcohol to one drink per day.     No smoking.     Wear sunscreen to prevent skin cancer.     See your dentist every six months for an exam and cleaning.     Patient Education     Tips to Control Acid Reflux    To control acid reflux, you ll need to make some basic diet and lifestyle changes. The simple steps outlined below may be all you ll need to ease discomfort.  Watch what you eat    Avoid fatty foods and spicy foods.    Eat fewer acidic foods, such as citrus and tomato-based foods. These can increase symptoms.    Limit drinking alcohol, caffeine, and fizzy beverages. All increase acid  reflux.    Try limiting chocolate, peppermint, and spearmint. These can worsen acid reflux in some people.  Watch when you eat    Avoid lying down for 3 hours after eating.    Do not snack before going to bed.  Raise your head  Raising your head and upper body by 4 to 6 inches helps limit reflux when you re lying down. Put blocks under the head of your bed frame to raise it.  Other changes    Lose weight, if you need to    Don t exercise near bedtime    Avoid tight-fitting clothes    Limit aspirin and ibuprofen    Stop smoking   Date Last Reviewed: 7/1/2016 2000-2019 [employer]. [address] [place] [address], [place] [address]. All rights reserved. This information is not intended as a substitute for professional medical care. Always follow your healthcare professional's instructions.

## 2020-01-07 ENCOUNTER — OFFICE VISIT (OUTPATIENT)
Dept: FAMILY MEDICINE | Facility: CLINIC | Age: 35
End: 2020-01-07
Payer: COMMERCIAL

## 2020-01-07 VITALS
WEIGHT: 283.2 LBS | BODY MASS INDEX: 39.65 KG/M2 | DIASTOLIC BLOOD PRESSURE: 88 MMHG | HEART RATE: 64 BPM | OXYGEN SATURATION: 99 % | TEMPERATURE: 98.2 F | SYSTOLIC BLOOD PRESSURE: 148 MMHG | RESPIRATION RATE: 20 BRPM | HEIGHT: 71 IN

## 2020-01-07 DIAGNOSIS — D17.30 LIPOMA OF SKIN AND SUBCUTANEOUS TISSUE: ICD-10-CM

## 2020-01-07 DIAGNOSIS — Z13.29 SCREENING FOR THYROID DISORDER: ICD-10-CM

## 2020-01-07 DIAGNOSIS — E66.01 MORBID OBESITY (H): ICD-10-CM

## 2020-01-07 DIAGNOSIS — Z13.220 LIPID SCREENING: ICD-10-CM

## 2020-01-07 DIAGNOSIS — K21.9 GASTROESOPHAGEAL REFLUX DISEASE, ESOPHAGITIS PRESENCE NOT SPECIFIED: ICD-10-CM

## 2020-01-07 DIAGNOSIS — Z00.00 ROUTINE GENERAL MEDICAL EXAMINATION AT A HEALTH CARE FACILITY: Primary | ICD-10-CM

## 2020-01-07 DIAGNOSIS — Z11.4 SCREENING FOR HUMAN IMMUNODEFICIENCY VIRUS WITHOUT PRESENCE OF RISK FACTORS: ICD-10-CM

## 2020-01-07 DIAGNOSIS — F10.10 ALCOHOL ABUSE: ICD-10-CM

## 2020-01-07 DIAGNOSIS — Z13.1 SCREENING FOR DIABETES MELLITUS: ICD-10-CM

## 2020-01-07 LAB
CHOLEST SERPL-MCNC: 231 MG/DL
GLUCOSE SERPL-MCNC: 93 MG/DL (ref 70–99)
HDLC SERPL-MCNC: 40 MG/DL
HIV 1+2 AB+HIV1 P24 AG SERPL QL IA: NONREACTIVE
LDLC SERPL CALC-MCNC: 153 MG/DL
NONHDLC SERPL-MCNC: 191 MG/DL
TRIGL SERPL-MCNC: 192 MG/DL
TSH SERPL DL<=0.005 MIU/L-ACNC: 0.89 MU/L (ref 0.4–4)

## 2020-01-07 PROCEDURE — 80061 LIPID PANEL: CPT | Performed by: NURSE PRACTITIONER

## 2020-01-07 PROCEDURE — 87389 HIV-1 AG W/HIV-1&-2 AB AG IA: CPT | Performed by: NURSE PRACTITIONER

## 2020-01-07 PROCEDURE — 99213 OFFICE O/P EST LOW 20 MIN: CPT | Mod: 25 | Performed by: NURSE PRACTITIONER

## 2020-01-07 PROCEDURE — 82947 ASSAY GLUCOSE BLOOD QUANT: CPT | Performed by: NURSE PRACTITIONER

## 2020-01-07 PROCEDURE — 90686 IIV4 VACC NO PRSV 0.5 ML IM: CPT | Performed by: NURSE PRACTITIONER

## 2020-01-07 PROCEDURE — 36415 COLL VENOUS BLD VENIPUNCTURE: CPT | Performed by: NURSE PRACTITIONER

## 2020-01-07 PROCEDURE — 84443 ASSAY THYROID STIM HORMONE: CPT | Performed by: NURSE PRACTITIONER

## 2020-01-07 PROCEDURE — 90471 IMMUNIZATION ADMIN: CPT | Performed by: NURSE PRACTITIONER

## 2020-01-07 PROCEDURE — 99395 PREV VISIT EST AGE 18-39: CPT | Mod: 25 | Performed by: NURSE PRACTITIONER

## 2020-01-07 ASSESSMENT — MIFFLIN-ST. JEOR: SCORE: 2241.72

## 2020-01-07 NOTE — LETTER
January 8, 2020      Noé Knight  [address] [facility] [address]        Dear ,    We are writing to inform you of your test results.    Normal blood labs.     Resulted Orders   HIV Screening   Result Value Ref Range    HIV Antigen Antibody Combo Nonreactive NR^Nonreactive          Comment:      HIV-1 p24 Ag & HIV-1/HIV-2 Ab Not Detected   Lipid panel reflex to direct LDL Fasting   Result Value Ref Range    Cholesterol 231 (H) <200 mg/dL      Comment:      Desirable:       <200 mg/dl    Triglycerides 192 (H) <150 mg/dL      Comment:      Borderline high:  150-199 mg/dl  High:             200-499 mg/dl  Very high:       >499 mg/dl  Fasting specimen      HDL Cholesterol 40 >39 mg/dL    LDL Cholesterol Calculated 153 (H) <100 mg/dL      Comment:      Above desirable:  100-129 mg/dl  Borderline High:  130-159 mg/dL  High:             160-189 mg/dL  Very high:       >189 mg/dl      Non HDL Cholesterol 191 (H) <130 mg/dL      Comment:      Above Desirable:  130-159 mg/dl  Borderline high:  160-189 mg/dl  High:             190-219 mg/dl  Very high:       >219 mg/dl     TSH with free T4 reflex   Result Value Ref Range    TSH 0.89 0.40 - 4.00 mU/L   Glucose   Result Value Ref Range    Glucose 93 70 - 99 mg/dL      Comment:      Fasting specimen       If you have any questions or concerns, please call the clinic at the number listed above.       Sincerely,        Maria Tate NP/eloinao

## 2020-01-30 ENCOUNTER — OFFICE VISIT (OUTPATIENT)
Dept: SURGERY | Facility: CLINIC | Age: 35
End: 2020-01-30
Payer: COMMERCIAL

## 2020-01-30 VITALS
WEIGHT: 283 LBS | DIASTOLIC BLOOD PRESSURE: 100 MMHG | OXYGEN SATURATION: 97 % | BODY MASS INDEX: 39.62 KG/M2 | HEART RATE: 77 BPM | HEIGHT: 71 IN | SYSTOLIC BLOOD PRESSURE: 165 MMHG

## 2020-01-30 DIAGNOSIS — D17.30 LIPOMA OF SKIN AND SUBCUTANEOUS TISSUE: Primary | ICD-10-CM

## 2020-01-30 PROCEDURE — 99203 OFFICE O/P NEW LOW 30 MIN: CPT | Performed by: SURGERY

## 2020-01-30 ASSESSMENT — MIFFLIN-ST. JEOR: SCORE: 2240.81

## 2020-01-30 NOTE — LETTER
1/30/2020         RE: Noé Knight  [address] [facility] [address]        Dear Colleague,    Thank you for referring your patient, Noé Knight, to the [facility]. Please see a copy of my visit note below.    I was asked to see Noé Knight regarding lipoma on left back by Olivier Shea PA-C    CC: Mass    HPI:  Patient is a 34 year old male with complaints of tender associated with his back mass. The patient first noticed the mass about 18 yrs he noticed the symptoms worsening over the last 2-3 months. The mass has grown slowly.     Patient does have a personal history of skin problems, he had what sounds like a sebaceous cyst on the right  Patient does not have a family history of skin cancer    Review Of Systems    General: negative for fever or chills  Skin: negative except mass noted above  Ears/Nose/Throat: negative for, epistaxis, bleeding gums  Respiratory: No shortness of breath, dyspnea on exertion, cough, or hemoptysis  Cardiovascular: negative for and chest pain  Gastrointestinal: negative for, nausea, vomiting and abdominal pain  Genitourinary: negative for and frequency  Neurologic: negative for and local weakness  Hematologic/Lymphatic/Immunologic: negative for, bleeding disorder and frequent infections  Endocrine: negative for, diabetes, polydipsia and polyuria    Past Medical History:   Diagnosis Date     Esophageal stricture     endoscopy with bougie dilatation     Seasonal allergies    HTN ?    Past Surgical History:   Procedure Laterality Date     OPEN REDUCTION INTERNAL FIXATION RODDING INTRAMEDULLAR FEMUR FRACTURE TABLE       wisdom teeth       Social History     Socioeconomic History     Marital status: Single     Spouse name: Not on file     Number of children: Not on file     Years of education: Not on file     Highest education level: Not on file   Occupational History     Occupation: [employer]   Social Needs     Financial resource  "strain: Not on file     Food insecurity:     Worry: Not on file     Inability: Not on file     Transportation needs:     Medical: Not on file     Non-medical: Not on file   Tobacco Use     Smoking status: Never Smoker     Smokeless tobacco: Never Used   Substance and Sexual Activity     Alcohol use: Yes     Alcohol/week: 0.0 standard drinks     Comment: social use only     Drug use: No     Sexual activity: Yes     Partners: Female   Lifestyle     Physical activity:     Days per week: Not on file     Minutes per session: Not on file     Stress: Not on file   Relationships     Social connections:     Talks on phone: Not on file     Gets together: Not on file     Attends Je[employer] witness service: Not on file     Active member of club or organization: Not on file     Attends meetings of clubs or organizations: Not on file     Relationship status: Not on file     Intimate partner violence:     Fear of current or ex partner: Not on file     Emotionally abused: Not on file     Physically abused: Not on file     Forced sexual activity: Not on file   Other Topics Concern     Parent/sibling w/ CABG, MI or angioplasty before 65F 55M? Not Asked   Social History Narrative     Not on file       Current Outpatient Medications   Medication Sig Dispense Refill     omeprazole (PRILOSEC) 20 MG DR capsule Take 1 capsule (20 mg) by mouth daily 90 capsule 3       Physical exam:   BP (!) 165/100   Pulse 77   Ht 1.803 m (5' 11\")   Wt 128.4 kg (283 lb)   SpO2 97%   BMI 39.47 kg/m       Exam:  Constitutional: healthy, alert and no distress  Eyes: Pupils round and equal, no icterus   ENT: Mucous membranes moist  Respiratory:  Non-labored respiration  Musculoskeletal: No gross deformity  Neurologic: No gross focal deficits  Psychiatric: mentation appears normal and affect normal/bright  Hematologic/Lymphatic/Immunologic: No bruising noted    Skin:  Lesion at left back and is mildly tender over site and consistent with lipoma    Assessment: " Lipoma   Plan to excise in the office under local.  Patient feels comfortable with this.      The risks benefits and alternatives of removing the mass (either in the office or the surgery center) were discussed with the patient including but not limited to pain, bleeding, infection, risks of general anesthesia (i.e. MI, CVA, PE, and death), and cosmetic deformity. Additionally we discussed the risk of recurrence    Dominick to follow up with Primary Care provider regarding elevated blood pressure.    Homer Barrera, DO      Again, thank you for allowing me to participate in the care of your patient.        Sincerely,        Homer Barrera, DO

## 2020-02-03 ENCOUNTER — TELEPHONE (OUTPATIENT)
Dept: SURGERY | Facility: CLINIC | Age: 35
End: 2020-02-03

## 2020-02-03 NOTE — TELEPHONE ENCOUNTER
Type of surgery: Cyst removal CPT code 98294  Lipoma of skin and subcataneous tissue (D17.30)  Location of surgery: [facility]  Date and time of surgery: 2-20-20  Surgeon: Dr. Barrera  Pre-Op Appt Date: NA  Post-Op Appt Date: NA   Packet sent out: Not Applicable  Pre-cert/Authorization completed:  No prior auth per [employer] grid. 02/03/2020  Date: 02/03/2020    Insurance valid 02/03/2020

## 2020-02-20 ENCOUNTER — OFFICE VISIT (OUTPATIENT)
Dept: SURGERY | Facility: CLINIC | Age: 35
End: 2020-02-20
Payer: COMMERCIAL

## 2020-02-20 DIAGNOSIS — L72.3 SEBACEOUS CYST: Primary | ICD-10-CM

## 2020-02-20 PROCEDURE — 11406 EXC TR-EXT B9+MARG >4.0 CM: CPT | Performed by: SURGERY

## 2020-02-20 NOTE — LETTER
2/20/2020         RE: Noé Knight  [address] [facility] [address]        Dear Colleague,    Thank you for referring your patient, Noé Knight, to the [facility]. Please see a copy of my visit note below.        PROCEDURE:   Written consent was obtained  The back was prepped and appropriately anesthetized with 1% lidocaine with epinephrine. Using the usual technique, full thickness elliptical excision in total was performed. The total area excised, including lesion and margins was 5 x 4 x 3 cm .  Closure was accomplished with a layered closure and final incision was 5 cm.  Derma gauthier with an imbedded steri strip was placed.  The procedure was well tolerated and without complications. Specimen was not sent to Pathology.        Again, thank you for allowing me to participate in the care of your patient.        Sincerely,        Homer Barrera, DO

## 2020-02-20 NOTE — PATIENT INSTRUCTIONS
POST OFFICE PROCEDURE INSTRUCTIONS    1. You will have some discomfort at the incision sites.  This is expected.  This should improve over the next 2-3 days.  Ice and pain medication will help with this pain.  Use prescribed pain medication as instructed.    2.  Bruising and mild swelling is normal after surgery.  The area around the incision will be hard and elevated.  This is normal and will resolve slowly over the next several months.  If you feel the pain is increasing and cannot explain it by increasing activity or you have fevers/chills call us at [phone].    3.  The dressing will often have some blood on it.  You may shower the day after surgery.  Wash gently over the steri-strip.  If there is no bleeding then there is no reason to cover the site.      4.  Avoid asprin for the first 72 hours after the procedure.  This medication may increase the tendency to bleed.    5.Use the following medications for pain    a.  Tylenol (acetaminophen) 650 mg every 6 hours as needed for mild pain.  Do not take more than 1000 mg every 6 hours or 3000mg/day    b.  Motrin (ibuprofen) 600 mg every 6 hours as needed for mild to moderate pain.  Take with food. Alternate with tylenol so you are taking one or the other every three hours. For example take tylenol at 5am, then ibuprofen at 8am, then tylenol at 11am, and so on.       6.  Notify Dr Morejon's clinic at [phone] if your discomfort is not relieved by your pain medication,  you have signs of infection such as temperature above 100.5 degrees orally, chills or increasing daily discomfort.  Your incision site is becoming more red and/or there is purulent drainage.  Or if you have any questions or concerns.

## 2020-02-20 NOTE — PROGRESS NOTES
PROCEDURE:   Written consent was obtained  The back was prepped and appropriately anesthetized with 1% lidocaine with epinephrine. Using the usual technique, full thickness elliptical excision in total was performed. The total area excised, including lesion and margins was 5 x 4 x 3 cm .  Closure was accomplished with a layered closure and final incision was 5 cm.  Derma gauthier with an imbedded steri strip was placed.  The procedure was well tolerated and without complications. Specimen was not sent to Pathology.

## 2020-03-18 ENCOUNTER — E-VISIT (OUTPATIENT)
Dept: FAMILY MEDICINE | Facility: CLINIC | Age: 35
End: 2020-03-18
Payer: COMMERCIAL

## 2020-03-18 DIAGNOSIS — R19.7 DIARRHEA OF PRESUMED INFECTIOUS ORIGIN: Primary | ICD-10-CM

## 2020-03-18 PROCEDURE — 99422 OL DIG E/M SVC 11-20 MIN: CPT | Performed by: NURSE PRACTITIONER

## 2020-03-18 RX ORDER — LOPERAMIDE HYDROCHLORIDE 2 MG/1
TABLET ORAL
Qty: 40 TABLET | Refills: 1 | Status: SHIPPED | OUTPATIENT
Start: 2020-03-18 | End: 2023-07-13

## 2020-03-18 NOTE — PATIENT INSTRUCTIONS
"  Patient Education     [place] Diet  Your healthcare provider may recommend a bland diet if you have an upset stomach. It consists of foods that are mild and easy to digest. It is better to eat small frequent meals rather than 3 large meals a day.    Beverages  OK: Fruit juices, non-caffeinated teas and coffee, non-carbonated nowak  Avoid: Carbonated beverage, caffeinated tea and coffee, all alcoholic beverages  Bread  OK: Refined white, wheat or rye bread, lizett or soda crackers, Karin toast, plain rolls, bagels  Avoid: Whole-grain bread  Cereal  OK: Refined cereals: cooked or ready to eat  Avoid: Whole-grain cereals and granola, or those containing bran, seeds or nuts  Desserts  OK: Peanut butter and all others except those to \"avoid\"  Avoid: Chocolate, cocoa, coconut, popcorn, nuts, seeds, jam, marmalade  Fruits  OK: Canned, cooked, frozen or fresh fruits without seeds or tough skin  Avoid: Olives, skin and seeds of fruit, dried fruit  Meats  OK: All fresh or preserved meat, fish and fowl  Avoid: Any that are prepared with those spices to \"avoid\"  Cheese and eggs  OK: Eggs, cottage cheese, cream cheese, other cheeses  Avoid: All cheeses made with those spices to \"avoid\"  Potatoes and pasta  OK: Potato, rice, macaroni, noodles, spaghetti  Avoid: None  Soups  OK: All soups without heavy seasoning  Avoid: Soups made with those spices to \"avoid\"  Vegetables  OK: Canned, cooked, fresh or frozen mildly flavored vegetables without seeds, skins or coarse fiber  Avoid: Vegetables prepared with those spices to \"avoid\"; skin and seeds of vegetables and those with coarse fiber, broccoli, cabbage, cauliflower, cucumber, green peppers, and corn  Spices  OK: Salt, lemon and limejuice, vinegar, all extracts, miesha, cinnamon, thyme, mace, allspice, paprika  Avoid: Chili powder, cloves, pepper, seed spices, garlic, gravy pickles, highly seasoned salad dressings  Date Last Reviewed: 5/1/2018 2000-2019 [employer] " [address] [place] [address]. All rights reserved. This information is not intended as a substitute for professional medical care. Always follow your healthcare professional's instructions.           Patient Education   Coping with Diarrhea  What is diarrhea?  If you have loose, watery bowel movements at least three times a day, you have diarrhea. You may also have gas and stomach cramps.  Emotional upset, infection and bad reactions to food may make diarrhea worse.  How is it treated?  Severe diarrhea can be treated with medicine, such as Imodium and Lomotil. These slow the digestive tract and reduce the amount of fluid lost in bowel movements. Your care team can tell you if medicine is right for you.  What else can I do to treat or prevent diarrhea?    Avoid:  ? Fried, fatty, greasy, spicy or very sweet foods  ? Caffeine and alcohol  ? High-fiber foods such as whole grains, raw vegetables, unpeeled fresh fruits, dried fruits, dried beans and peas, nuts, seeds and popcorn  ? Chewing gum and sugar-free candies (these often contain a sugar alcohol that may increase diarrhea)  ? Gas-forming foods such as broccoli, cauliflower, brussels sprouts and carbonated (fizzy) drinks.    Eat smaller amounts of food, but eat more often. Serve foods cold or at room temperature.    Drink six to eight 8-ounce glasses of fluid each day, such as:  ? Fruit juice, watered-down (half water)  ? Broth or gelatin  ? Popsicles  ? Sports drinks or flat decaf soda pop (leave it open for at least 10 minutes before drinking).    Limit milk products to two low-fat servings daily.    Increase your potassium with watered-down orange juice, sports drinks, potatoes without skin, bananas or tomato products.    Increase your sodium with soups and broths, sports drinks, crackers and pretzels.    Rest and avoid stress.    Weigh yourself daily. Keep a record of your weight and how often you pass loose stools.    Take warm baths to keep  yourself clean. Tell your doctor if your rectum is red, painful or swollen.    Think about buying a skin barrier (such as Aquaphor) at the drug store. This can help protect the skin around your rectum from irritation and skin breakdown. Use it after each bowel movement.    If diarrhea is severe, have only clear liquids (liquids that you can see through) until it stops. Once it stops, slowly return to your normal diet.  When should I call my care team?  Call your care team if:    You follow the steps listed here, but your diarrhea does not improve within 24 hours.    You have more than six loose stools in one day.    You have sudden, severe belly pain.    You have been unable to eat for over two days.    You have fever or dizziness along with diarrhea.    You notice blood in your stool or you have black, tarry stools.  Food group Recommended foods Foods to avoid   Meats, eggs and cheese Broiled or baked lean meat, fish, chicken or turkey (no skin). Eggs, well-cooked. Beans. Chicken or turkey with the skin.   Breads and starches Breads, rolls and pastas made from white flour. Instant white rice, refined cereals (Cream of Wheat, Cream of Rice, Cornflakes, Rice Krispies), pancakes, waffles, muffins, cornbread, lizett crackers. Whole grain breads and cereals, bran, Shredded Wheat, wild rice, granola.   Fruits and vegetables Canned, frozen or peeled fresh fruits such as bananas or applesauce. Tomato paste, tomato sauce, tomato puree, cooked vegetables (acorn squash, asparagus, beets, carrots, celery, green beans, mushrooms, baked potato without skin, peeled zucchini). Unpeeled fruits, melons, grapefruit juice, all vegetables not listed on the left.   Milk products Skim or 1% milk, low-fat yogurt, low-fat cheese. Whole or 2% milk, high-fat ice cream, cream.   Drinks Sports drinks, watered-down fruit juices. (No caffeine.) Prune juice, caffeine.   Desserts Cookies, cake, gelatin, sherbet, fruit pies (avoid pies made with  unpeeled fruit). Nuts, coconut, chocolate, licorice.   Other Broth, butter, margarine, mayonnaise, salad dressing, vegetable oil. Hot sauce, pepper, chili powder, taco seasoning.   Comments:  __________________________________________  __________________________________________  __________________________________________  __________________________________________  __________________________________________  __________________________________________  __________________________________________  __________________________________________  __________________________________________  __________________________________________  For informational purposes only. Not to replace the advice of your health care provider. Copyright   2006 [facility]. All rights reserved. Clinically reviewed by [facility]. Online [employer]er [id number] - REV 04/19.

## 2020-05-13 NOTE — PROGRESS NOTES
I was asked to see Noé Knight regarding lipoma on left back by Olivier Shea PA-C    CC: Mass    HPI:  Patient is a 34 year old male with complaints of tender associated with his back mass. The patient first noticed the mass about 18 yrs he noticed the symptoms worsening over the last 2-3 months. The mass has grown slowly.     Patient does have a personal history of skin problems, he had what sounds like a sebaceous cyst on the right  Patient does not have a family history of skin cancer    Review Of Systems    General: negative for fever or chills  Skin: negative except mass noted above  Ears/Nose/Throat: negative for, epistaxis, bleeding gums  Respiratory: No shortness of breath, dyspnea on exertion, cough, or hemoptysis  Cardiovascular: negative for and chest pain  Gastrointestinal: negative for, nausea, vomiting and abdominal pain  Genitourinary: negative for and frequency  Neurologic: negative for and local weakness  Hematologic/Lymphatic/Immunologic: negative for, bleeding disorder and frequent infections  Endocrine: negative for, diabetes, polydipsia and polyuria    Past Medical History:   Diagnosis Date     Esophageal stricture     endoscopy with bougie dilatation     Seasonal allergies    HTN ?    Past Surgical History:   Procedure Laterality Date     OPEN REDUCTION INTERNAL FIXATION RODDING INTRAMEDULLAR FEMUR FRACTURE TABLE       wisdom teeth       Social History     Socioeconomic History     Marital status: Single     Spouse name: Not on file     Number of children: Not on file     Years of education: Not on file     Highest education level: Not on file   Occupational History     Occupation: [employer]   Social Needs     Financial resource strain: Not on file     Food insecurity:     Worry: Not on file     Inability: Not on file     Transportation needs:     Medical: Not on file     Non-medical: Not on file   Tobacco Use     Smoking status: Never Smoker     Smokeless tobacco:  It appears that this patient had been seeing Dr. Shirley and an MD at [facility].  This is not Dr. Mart's patient.  She is turning 18 years old.    "Never Used   Substance and Sexual Activity     Alcohol use: Yes     Alcohol/week: 0.0 standard drinks     Comment: social use only     Drug use: No     Sexual activity: Yes     Partners: Female   Lifestyle     Physical activity:     Days per week: Not on file     Minutes per session: Not on file     Stress: Not on file   Relationships     Social connections:     Talks on phone: Not on file     Gets together: Not on file     Attends Religion service: Not on file     Active member of club or organization: Not on file     Attends meetings of clubs or organizations: Not on file     Relationship status: Not on file     Intimate partner violence:     Fear of current or ex partner: Not on file     Emotionally abused: Not on file     Physically abused: Not on file     Forced sexual activity: Not on file   Other Topics Concern     Parent/sibling w/ CABG, MI or angioplasty before 65F 55M? Not Asked   Social History Narrative     Not on file       Current Outpatient Medications   Medication Sig Dispense Refill     omeprazole (PRILOSEC) 20 MG DR capsule Take 1 capsule (20 mg) by mouth daily 90 capsule 3       Physical exam:   BP (!) 165/100   Pulse 77   Ht 1.803 m (5' 11\")   Wt 128.4 kg (283 lb)   SpO2 97%   BMI 39.47 kg/m      Exam:  Constitutional: healthy, alert and no distress  Eyes: Pupils round and equal, no icterus   ENT: Mucous membranes moist  Respiratory:  Non-labored respiration  Musculoskeletal: No gross deformity  Neurologic: No gross focal deficits  Psychiatric: mentation appears normal and affect normal/bright  Hematologic/Lymphatic/Immunologic: No bruising noted    Skin:  Lesion at left back and is mildly tender over site and consistent with lipoma    Assessment: Lipoma   Plan to excise in the office under local.  Patient feels comfortable with this.      The risks benefits and alternatives of removing the mass (either in the office or the surgery center) were discussed with the patient including but not " limited to pain, bleeding, infection, risks of general anesthesia (i.e. MI, CVA, PE, and death), and cosmetic deformity. Additionally we discussed the risk of recurrence    Dominick to follow up with Primary Care provider regarding elevated blood pressure.    Homer Barrera, DO

## 2020-12-27 ENCOUNTER — HEALTH MAINTENANCE LETTER (OUTPATIENT)
Age: 35
End: 2020-12-27

## 2021-01-19 ENCOUNTER — OFFICE VISIT (OUTPATIENT)
Dept: FAMILY MEDICINE | Facility: CLINIC | Age: 36
End: 2021-01-19
Payer: COMMERCIAL

## 2021-01-19 ENCOUNTER — NURSE TRIAGE (OUTPATIENT)
Dept: PEDIATRICS | Facility: CLINIC | Age: 36
End: 2021-01-19

## 2021-01-19 ENCOUNTER — ANCILLARY PROCEDURE (OUTPATIENT)
Dept: ULTRASOUND IMAGING | Facility: CLINIC | Age: 36
End: 2021-01-19
Attending: PHYSICIAN ASSISTANT
Payer: COMMERCIAL

## 2021-01-19 VITALS
BODY MASS INDEX: 41.9 KG/M2 | HEART RATE: 88 BPM | SYSTOLIC BLOOD PRESSURE: 160 MMHG | TEMPERATURE: 98.4 F | WEIGHT: 300.4 LBS | OXYGEN SATURATION: 96 % | RESPIRATION RATE: 16 BRPM | DIASTOLIC BLOOD PRESSURE: 112 MMHG

## 2021-01-19 DIAGNOSIS — R03.0 ELEVATED BLOOD PRESSURE READING WITHOUT DIAGNOSIS OF HYPERTENSION: ICD-10-CM

## 2021-01-19 DIAGNOSIS — R10.11 RUQ ABDOMINAL PAIN: Primary | ICD-10-CM

## 2021-01-19 DIAGNOSIS — R10.11 RUQ ABDOMINAL PAIN: ICD-10-CM

## 2021-01-19 PROCEDURE — 99213 OFFICE O/P EST LOW 20 MIN: CPT | Performed by: PHYSICIAN ASSISTANT

## 2021-01-19 NOTE — PROGRESS NOTES
"  Assessment & Plan     1. RUQ abdominal pain    2. Elevated blood pressure reading without diagnosis of hypertension        1) We discussed different imaging modalities. Will start with an abdominal US to evaluate the gallbladder. If neg, may obtain an abd/pelv CT for further evaluation.     2) Could be elevated due to pain. I asked him to return for a blood pressure check in 2-4 weeks when his pain has resolved.            BMI:   Estimated body mass index is 41.9 kg/m  as calculated from the following:    Height as of 1/30/20: 1.803 m (5' 11\").    Weight as of this encounter: 136.3 kg (300 lb 6.4 oz).       Return for an ultrasound.    FLAQUITO [facility] LISETH Tan is a 35 year old who presents to clinic today for the following health issues     HPI       Abdominal/Flank Pain  Onset/Duration: x1 day  Description:   Character: Dull ache and Cramping  Location: right flank  Radiation: None  Intensity: mild  Progression of Symptoms:  worsening  Accompanying Signs & Symptoms:  Fever/Chills: no  Gas/Bloating: no  Nausea: YES  Vomitting: no  Diarrhea: no  Constipation: no  Dysuria or Hematuria: no  History:   Trauma: no  Previous similar pain: no  Previous tests done: none  Precipitating factors:   Does the pain change with:     Food: no    Bowel Movement: no    Urination: no   Other factors:  YES- with movement   Therapies tried and outcome: None      Review of Systems   Constitutional, gi and gu systems are negative, except as otherwise noted.      Objective    BP (!) 160/112   Pulse 88   Temp 98.4  F (36.9  C) (Tympanic)   Resp 16   Wt 136.3 kg (300 lb 6.4 oz)   SpO2 96%   BMI 41.90 kg/m    Body mass index is 41.9 kg/m .  Physical Exam   GENERAL: alert, no distress and obese  ABDOMEN: tenderness RUQ, RLQ and neg Rubio's and no organomegaly or masses  MS: no gross musculoskeletal defects noted, no edema  SKIN: no suspicious lesions or rashes  NEURO: Normal " strength and tone, mentation intact and speech normal  PSYCH: mentation appears normal, affect normal/bright

## 2021-01-19 NOTE — TELEPHONE ENCOUNTER
"Patient reports drinking 7 alcoholic beverages on both Saturday and Sunday. Yesterday morning patient developed mild abdominal pain in the upper right quadrant of his abdomen. Pain is as 2 but will spike up to a 4-5 when lying down.     Additional Information    Mild pain that comes and goes (cramps) lasts > 24 hours    Alcohol abuse known or suspected    Answer Assessment - Initial Assessment Questions  1. LOCATION: \"Where does it hurt?\"       Upper abdomen to the right side.   2. RADIATION: \"Does the pain shoot anywhere else?\" (e.g., chest, back)      No but if he presses on the spot the pain will radiate to the center of abdomen.   3. ONSET: \"When did the pain begin?\" (e.g., minutes, hours or days ago)       Yesterday morning.   4. SUDDEN: \"Gradual or sudden onset?\"      Gradual  5. PATTERN \"Does the pain come and go, or is it constant?\"     - If constant: \"Is it getting better, staying the same, or worsening?\"       (Note: Constant means the pain never goes away completely; most serious pain is constant and it progresses)      - If intermittent: \"How long does it last?\" \"Do you have pain now?\"      (Note: Intermittent means the pain goes away completely between bouts)      Intermittent. Pain present until position is changed. Slight pain right now.   6. SEVERITY: \"How bad is the pain?\"  (e.g., Scale 1-10; mild, moderate, or severe)     - MILD (1-3): doesn't interfere with normal activities, abdomen soft and not tender to touch      - MODERATE (4-7): interferes with normal activities or awakens from sleep, tender to touch      - SEVERE (8-10): excruciating pain, doubled over, unable to do any normal activities        Pain right now is a 2. Last night while supine pain was a 4-5.  7. RECURRENT SYMPTOM: \"Have you ever had this type of abdominal pain before?\" If so, ask: \"When was the last time?\" and \"What happened that time?\"       No  8. AGGRAVATING FACTORS: \"Does anything seem to cause this pain?\" (e.g., foods, " "stress, alcohol)      Over the weekend had 7 acholic beverages each night.    9. CARDIAC SYMPTOMS: \"Do you have any of the following symptoms: chest pain, difficulty breathing, sweating, nausea?\"      Nausea   10. OTHER SYMPTOMS: \"Do you have any other symptoms?\" (e.g., fever, vomiting, diarrhea)        Looser stool   11. PREGNANCY: \"Is there any chance you are pregnant?\" \"When was your last menstrual period?\"        n/a    Protocols used: ABDOMINAL PAIN - UPPER-A-OH    Janice Figueroa RN on 1/19/2021 at 8:24 AM    "

## 2021-01-20 ENCOUNTER — TELEPHONE (OUTPATIENT)
Dept: FAMILY MEDICINE | Facility: CLINIC | Age: 36
End: 2021-01-20

## 2021-01-20 NOTE — TELEPHONE ENCOUNTER
Please call patient with the following info:    US shows a fatty, enlarged liver. No signs of gallbladder inflammation/infection. Has pain or sxs changed in any way since yesterday?

## 2021-01-20 NOTE — TELEPHONE ENCOUNTER
Called patient.  He stated that the pain is slightly better for the most part.  It is worse when he moves, but it feels slightly better when he is sitting down.    He denies any further symptoms.    Marisol HINTONN-RN  Triage Nurse  [facility]: [facility]

## 2021-01-21 NOTE — TELEPHONE ENCOUNTER
Spoke with patient and informed him per Marlena Cox PA-C, see below read word for word.  Patient verbalized understanding and stated he will call back if symptoms worsen

## 2021-01-21 NOTE — TELEPHONE ENCOUNTER
Ok, let's continue to monitor for now. Try to avoid things that aggravate or cause the pain to worsen. If pain becomes more frequent or worsens we can go ahead with the CT scan

## 2021-02-17 ENCOUNTER — ALLIED HEALTH/NURSE VISIT (OUTPATIENT)
Dept: NURSING | Facility: CLINIC | Age: 36
End: 2021-02-17
Payer: COMMERCIAL

## 2021-02-17 VITALS — DIASTOLIC BLOOD PRESSURE: 98 MMHG | HEART RATE: 71 BPM | SYSTOLIC BLOOD PRESSURE: 144 MMHG

## 2021-02-17 DIAGNOSIS — Z23 ENCOUNTER FOR IMMUNIZATION: Primary | ICD-10-CM

## 2021-02-17 PROCEDURE — 90686 IIV4 VACC NO PRSV 0.5 ML IM: CPT

## 2021-02-17 PROCEDURE — 90471 IMMUNIZATION ADMIN: CPT

## 2021-02-17 PROCEDURE — 99207 PR NO CHARGE NURSE ONLY: CPT

## 2021-02-17 NOTE — PROGRESS NOTES
SUBJECTIVE:  Noé Knight is a 35 year old male who presents for a follow up evaluation of his hypertension.    The reason for the visit is:  Elevated blood pressure at recent office visit      Patient is not monitoring Blood Pressure at home.      Current complaints: headaches      Current Outpatient Medications   Medication     omeprazole (PRILOSEC) 20 MG DR capsule     loperamide (IMODIUM A-D) 2 MG tablet     No current facility-administered medications for this visit.        No Known Allergies      OBJECTIVE:  Please get a blood pressure AND a pulse.  A height is also needed if has not been done in the past year.    BP (!) 144/98 (BP Location: Right arm, Cuff Size: Adult Large)   Pulse 71     Vitals as recorded, a large cuff was used.    ASSESSMENT:    Is the HYPERTENSION goal on the problem list? No  Patient Active Problem List   Diagnosis     Hyperlipidemia LDL goal <160     Non morbid obesity, unspecified obesity type     Obesity (BMI 35.0-39.9) with comorbidity (H)     Alcohol abuse     Gastroesophageal reflux disease, esophagitis presence not specified     Lipoma of skin and subcutaneous tissue       Plan:    The patient s blood pressure is higher than goal but is less than 180 systolically AND less that 110 diastolically. The patient will be discharged home.  A telephone encounter will be created with this note and sent to the patient's primary provider for action.   Sumi Paredes CMA on 2/17/2021 at 10:37 AM

## 2021-03-06 ENCOUNTER — HEALTH MAINTENANCE LETTER (OUTPATIENT)
Age: 36
End: 2021-03-06

## 2021-08-23 DIAGNOSIS — K21.9 GASTROESOPHAGEAL REFLUX DISEASE: ICD-10-CM

## 2021-10-04 ENCOUNTER — HEALTH MAINTENANCE LETTER (OUTPATIENT)
Age: 36
End: 2021-10-04

## 2022-03-20 ENCOUNTER — HEALTH MAINTENANCE LETTER (OUTPATIENT)
Age: 37
End: 2022-03-20

## 2022-09-11 ENCOUNTER — HEALTH MAINTENANCE LETTER (OUTPATIENT)
Age: 37
End: 2022-09-11

## 2023-04-30 ENCOUNTER — HEALTH MAINTENANCE LETTER (OUTPATIENT)
Age: 38
End: 2023-04-30

## 2023-07-13 ENCOUNTER — OFFICE VISIT (OUTPATIENT)
Dept: FAMILY MEDICINE | Facility: CLINIC | Age: 38
End: 2023-07-13
Payer: COMMERCIAL

## 2023-07-13 VITALS
DIASTOLIC BLOOD PRESSURE: 104 MMHG | WEIGHT: 296 LBS | HEIGHT: 71 IN | TEMPERATURE: 97.7 F | SYSTOLIC BLOOD PRESSURE: 178 MMHG | RESPIRATION RATE: 15 BRPM | BODY MASS INDEX: 41.44 KG/M2 | OXYGEN SATURATION: 98 % | HEART RATE: 70 BPM

## 2023-07-13 DIAGNOSIS — Z11.59 NEED FOR HEPATITIS C SCREENING TEST: ICD-10-CM

## 2023-07-13 DIAGNOSIS — K21.9 GASTROESOPHAGEAL REFLUX DISEASE WITHOUT ESOPHAGITIS: ICD-10-CM

## 2023-07-13 DIAGNOSIS — Z00.00 ROUTINE GENERAL MEDICAL EXAMINATION AT A HEALTH CARE FACILITY: Primary | ICD-10-CM

## 2023-07-13 DIAGNOSIS — F10.10 ALCOHOL ABUSE: ICD-10-CM

## 2023-07-13 DIAGNOSIS — E78.5 HYPERLIPIDEMIA LDL GOAL <160: ICD-10-CM

## 2023-07-13 DIAGNOSIS — K76.0 FATTY LIVER: ICD-10-CM

## 2023-07-13 DIAGNOSIS — I10 BENIGN ESSENTIAL HYPERTENSION: ICD-10-CM

## 2023-07-13 LAB
ALBUMIN SERPL BCG-MCNC: 4.7 G/DL (ref 3.5–5.2)
ALP SERPL-CCNC: 116 U/L (ref 40–129)
ALT SERPL W P-5'-P-CCNC: 60 U/L (ref 0–70)
ANION GAP SERPL CALCULATED.3IONS-SCNC: 12 MMOL/L (ref 7–15)
AST SERPL W P-5'-P-CCNC: 48 U/L (ref 0–45)
BILIRUB SERPL-MCNC: 0.6 MG/DL
BUN SERPL-MCNC: 10.6 MG/DL (ref 6–20)
CALCIUM SERPL-MCNC: 9.6 MG/DL (ref 8.6–10)
CHLORIDE SERPL-SCNC: 104 MMOL/L (ref 98–107)
CHOLEST SERPL-MCNC: 228 MG/DL
CREAT SERPL-MCNC: 0.99 MG/DL (ref 0.67–1.17)
CREAT UR-MCNC: 167 MG/DL
DEPRECATED HCO3 PLAS-SCNC: 26 MMOL/L (ref 22–29)
GFR SERPL CREATININE-BSD FRML MDRD: >90 ML/MIN/1.73M2
GLUCOSE SERPL-MCNC: 113 MG/DL (ref 70–99)
HBA1C MFR BLD: 5.1 % (ref 0–5.6)
HCV AB SERPL QL IA: NONREACTIVE
HDLC SERPL-MCNC: 44 MG/DL
LDLC SERPL CALC-MCNC: 152 MG/DL
MICROALBUMIN UR-MCNC: 20 MG/L
MICROALBUMIN/CREAT UR: 11.98 MG/G CR (ref 0–17)
NONHDLC SERPL-MCNC: 184 MG/DL
POTASSIUM SERPL-SCNC: 4.9 MMOL/L (ref 3.4–5.3)
PROT SERPL-MCNC: 7.7 G/DL (ref 6.4–8.3)
SODIUM SERPL-SCNC: 142 MMOL/L (ref 136–145)
TRIGL SERPL-MCNC: 162 MG/DL
TSH SERPL DL<=0.005 MIU/L-ACNC: 0.71 UIU/ML (ref 0.3–4.2)

## 2023-07-13 PROCEDURE — 84443 ASSAY THYROID STIM HORMONE: CPT | Performed by: NURSE PRACTITIONER

## 2023-07-13 PROCEDURE — 82043 UR ALBUMIN QUANTITATIVE: CPT | Performed by: NURSE PRACTITIONER

## 2023-07-13 PROCEDURE — 83036 HEMOGLOBIN GLYCOSYLATED A1C: CPT | Performed by: NURSE PRACTITIONER

## 2023-07-13 PROCEDURE — 80061 LIPID PANEL: CPT | Performed by: NURSE PRACTITIONER

## 2023-07-13 PROCEDURE — 82570 ASSAY OF URINE CREATININE: CPT | Performed by: NURSE PRACTITIONER

## 2023-07-13 PROCEDURE — 80053 COMPREHEN METABOLIC PANEL: CPT | Performed by: NURSE PRACTITIONER

## 2023-07-13 PROCEDURE — 36415 COLL VENOUS BLD VENIPUNCTURE: CPT | Performed by: NURSE PRACTITIONER

## 2023-07-13 PROCEDURE — 99214 OFFICE O/P EST MOD 30 MIN: CPT | Mod: 25 | Performed by: NURSE PRACTITIONER

## 2023-07-13 PROCEDURE — 99395 PREV VISIT EST AGE 18-39: CPT | Performed by: NURSE PRACTITIONER

## 2023-07-13 PROCEDURE — 86803 HEPATITIS C AB TEST: CPT | Performed by: NURSE PRACTITIONER

## 2023-07-13 RX ORDER — LISINOPRIL 20 MG/1
20 TABLET ORAL DAILY
Qty: 30 TABLET | Refills: 1 | Status: SHIPPED | OUTPATIENT
Start: 2023-07-13 | End: 2023-08-09

## 2023-07-13 ASSESSMENT — ENCOUNTER SYMPTOMS
SORE THROAT: 0
PARESTHESIAS: 0
EYE PAIN: 0
PALPITATIONS: 0
FEVER: 0
MYALGIAS: 0
FREQUENCY: 0
WEAKNESS: 0
COUGH: 0
HEMATURIA: 0
HEADACHES: 0
JOINT SWELLING: 0
NAUSEA: 0
ARTHRALGIAS: 0
DYSURIA: 0
DIZZINESS: 0
HEARTBURN: 1
HEARTBURN: 0
NERVOUS/ANXIOUS: 1
HEMATOCHEZIA: 0
ABDOMINAL PAIN: 0
CHILLS: 0
DIARRHEA: 0
SHORTNESS OF BREATH: 0
CONSTIPATION: 0

## 2023-07-13 ASSESSMENT — PAIN SCALES - GENERAL: PAINLEVEL: NO PAIN (0)

## 2023-07-13 NOTE — PROGRESS NOTES
SUBJECTIVE:   CC: Dominick is an 37 year old who presents for preventative health visit.     Healthy Habits:     Getting at least 3 servings of Calcium per day:  Yes    Bi-annual eye exam:  NO    Dental care twice a year:  Yes    Sleep apnea or symptoms of sleep apnea:  None    Diet:  Regular (no restrictions)    Frequency of exercise:  2-3 days/week    Duration of exercise:  45-60 minutes    Taking medications regularly:  Yes    Medication side effects:  None    Additional concerns today:  Yes    *Elevated b.p. at dentist this AM. Has not checked b.p. recently. Denies: chest pain, HA, edema or SOB    Have you ever done Advance Care Planning? (For example, a Health Directive, POLST, or a discussion with a medical provider or your loved ones about your wishes): No, advance care planning information given to patient to review.  Patient declined advance care planning discussion at this time.    Social History     Tobacco Use     Smoking status: Never     Smokeless tobacco: Never   Substance Use Topics     Alcohol use: Yes     Alcohol/week: 0.0 standard drinks of alcohol     Comment: social use only           7/13/2023     8:08 AM   Alcohol Use   Prescreen: >3 drinks/day or >7 drinks/week? Yes   AUDIT SCORE  11         7/13/2023     8:08 AM   AUDIT - Alcohol Use Disorders Identification Test - Reproduced from the World Health Organization Audit 2001 (Second Edition)   1.  How often do you have a drink containing alcohol? 4 or more times a week   2.  How many drinks containing alcohol do you have on a typical day when you are drinking? 3 or 4   3.  How often do you have five or more drinks on one occasion? Weekly   4.  How often during the last year have you found that you were not able to stop drinking once you had started? Never   5.  How often during the last year have you failed to do what was normally expected of you because of drinking? Never   6.  How often during the last year have you needed a first drink in the  morning to get yourself going after a heavy drinking session? Never   7.  How often during the last year have you had a feeling of guilt or remorse after drinking? Less than monthly   8.  How often during the last year have you been unable to remember what happened the night before because of your drinking? Never   9.  Have you or someone else been injured because of your drinking? No   10. Has a relative, friend, doctor or other health care worker been concerned about your drinking or suggested you cut down? Yes, but not in the last year   TOTAL SCORE 11       Last PSA: No results found for: PSA    Reviewed orders with patient. Reviewed health maintenance and updated orders accordingly - Yes    Lab work is in process  Labs [facility] in [facility]  Current Outpatient Medications   Medication Sig Dispense Refill     lisinopril (ZESTRIL) 20 MG tablet Take 1 tablet (20 mg) by mouth daily 30 tablet 1     omeprazole (PRILOSEC) 20 MG DR capsule Take 1 capsule (20 mg) by mouth daily 90 capsule 3     No Known Allergies    Reviewed and updated as needed this visit by clinical staff   Tobacco  Allergies  Meds              Reviewed and updated as needed this visit by Provider                    Here today for physical.  Is fasting today for labs.  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  Was at dentist and had elevated blood pressure.  Blood pressure has been elevated in the past-always thought to be due to pain or stress. Father, brother and mom on blood pressure medication. Does have some anxiety. Does not feel hot or flushed. No pain or tightness in chest. No vision changes. Does not feel flushed.  No vision changes.  Does drink significant amount of alcohol.  Reports over the last 3 years has been trying to decrease dose.  Has never been to treatment, is not interested.  Concerned would affect his CDL.  Has had previous abdominal ultrasound which showed enlarged liver, fatty liver disease.    Takes  "omeprazole daily for the last 5 5 years.  Did have EGD about 20 years ago. Has had ulcer and bacteria in gut at that time.  Was so severe that was not able to eat for nearly 2 weeks..     Last PSA: Never, no family history   Last Colonoscopy: Thinks PGF had colon cancer. Thinks dads colonoscopies have been okay.   Last eye exam:  Some time ago  Last dental exam: every 6 mo  Last tetanus vaccine: 2015  Last influenza vaccine: 11/22  Last shingles vaccine: N/A  Last pneumonia vaccine: N/A  Last COVID vaccine: has had both doses  Last COVID booster: done 11/22  Hep C screen: will do here today  HIV screen: done 2020  AAA screen (age 65-75 with smoking hx): N/A  IVD (HTN, Hyperlipid, Smoking): N/A  Lung CA screening (50-77, 20 pk smoking hx) Quit within 15 years: N/A  .        Review of Systems   Constitutional: Negative for chills and fever.   HENT: Negative for congestion, ear pain, hearing loss and sore throat.    Eyes: Negative for pain and visual disturbance.   Respiratory: Negative for cough and shortness of breath.    Cardiovascular: Negative for chest pain, palpitations and peripheral edema.   Gastrointestinal: Positive for heartburn. Negative for abdominal pain, constipation, diarrhea, hematochezia and nausea.   Genitourinary: Negative for dysuria, frequency, genital sores, hematuria, impotence, penile discharge and urgency.   Musculoskeletal: Negative for arthralgias, joint swelling and myalgias.   Skin: Negative for rash.   Neurological: Negative for dizziness, weakness, headaches and paresthesias.   Psychiatric/Behavioral: Negative for mood changes. The patient is nervous/anxious.        OBJECTIVE:   BP (!) 178/104   Pulse 70   Temp 97.7  F (36.5  C) (Tympanic)   Resp 15   Ht 1.803 m (5' 11\")   Wt 134.3 kg (296 lb)   SpO2 98%   BMI 41.28 kg/m      Physical Exam  Constitutional:       Appearance: He is well-developed.   HENT:      Right Ear: Tympanic membrane and external ear normal.      Left Ear: " Tympanic membrane and external ear normal.      Mouth/Throat:      Pharynx: Uvula midline.   Neck:      Thyroid: No thyromegaly.      Vascular: No carotid bruit.   Cardiovascular:      Rate and Rhythm: Normal rate and regular rhythm.      Heart sounds: Normal heart sounds.   Pulmonary:      Effort: Pulmonary effort is normal.      Breath sounds: Normal breath sounds.   Abdominal:      General: Bowel sounds are normal.      Palpations: Abdomen is soft.   Skin:     General: Skin is warm and dry.   Neurological:      Mental Status: He is alert.             ASSESSMENT/PLAN:   Routine general medical examination at a health care facility  Screening guidelines reviewed.   - Adult Eye  Referral; Future    Need for hepatitis C screening test  - Hepatitis C Screen Reflex to HCV RNA Quant and Genotype; Future  - Hepatitis C Screen Reflex to HCV RNA Quant and Genotype    Hyperlipidemia LDL goal <160  - Lipid panel reflex to direct LDL Fasting; Future  - Lipid panel reflex to direct LDL Fasting    Gastroesophageal reflux disease without esophagitis  Briefly discussed discontinuing omeprazole and taking famotidine in its place.  Due to previous history of ulcer, presumably H. pylori and poor appetite we will plan to continue omeprazole.  Consider starting famotidine if decrease his alcohol intake and has weight reduction.  - omeprazole (PRILOSEC) 20 MG DR capsule; Take 1 capsule (20 mg) by mouth daily    Alcohol abuse  Discussed referral for outpatient treatment or to addiction medicine-declines.  Plans to continue to try to decrease use on own.  Notify if changes mind and would be happy to place referral at any time.  - US Abdomen Limited; Future    Fatty liver  We will recheck liver function here today.  Obtain updated ultrasound.  Encouraged to decrease alcohol intake  - US Abdomen Limited; Future    Benign essential hypertension  Education given regarding hypertension.  Recommend low salt diet.  Prescription sent  for home blood pressure cuff-educated on use of an technique for checking home blood pressure.  We will plan to start on lisinopril 20 mg daily.  Educated on use and possible side effects.  Will enroll in RN hypertension management program.  We will check labs here today.  - lisinopril (ZESTRIL) 20 MG tablet; Take 1 tablet (20 mg) by mouth daily  - RN HTN MGMT  - Albumin Random Urine Quantitative with Creat Ratio; Future  - Hemoglobin A1c; Future  - TSH with free T4 reflex; Future  - Comprehensive metabolic panel; Future  - Adult Eye  Referral; Future  - Home Blood Pressure Monitor Order  - Albumin Random Urine Quantitative with Creat Ratio  - Hemoglobin A1c  - TSH with free T4 reflex  - Comprehensive metabolic panel        COUNSELING:   Reviewed preventive health counseling, as reflected in patient instructions        He reports that he has never smoked. He has never used smokeless tobacco.      [facility]  DME (Durable Medical Equipment) Orders and Documentation  Orders Placed This Encounter   Procedures     Home Blood Pressure Monitor Order      The patient was assessed and it was determined the patient is in need of the following listed DME Supplies/Equipment. Please complete supporting documentation below to demonstrate medical necessity.      DME All Other Item(s) Documentation    List reason for need and supporting documentation for medical necessity below for each DME item.     1. HTN

## 2023-07-13 NOTE — PATIENT INSTRUCTIONS
You can call imaging scheduling to set up appointment date, time, and location that works best for you to have US of liver [phone]     Plan to check blood pressure out in the community and notify if is consistently (50% of time) above 130/80.      I have sent a prescription to the pharmacy for lisinopril.  This is a medication to treat high blood pressure.  You will be hearing from our RN care team.  Moving forward, the RNs will be the ones adjusting your blood pressure medications.      Preventive Health Recommendations  Male Ages 26 - 39    Yearly exam:             See your health care provider every year in order to  o   Review health changes.   o   Discuss preventive care.    o   Review your medicines if your doctor has prescribed any.  You should be tested each year for STDs (sexually transmitted diseases), if you re at risk.   After age 35, talk to your provider about cholesterol testing. If you are at risk for heart disease, have your cholesterol tested at least every 5 years.   If you are at risk for diabetes, you should have a diabetes test (fasting glucose).  Shots: Get a flu shot each year. Get a tetanus shot every 10 years.     Nutrition:  Eat at least 5 servings of fruits and vegetables daily.   Eat whole-grain bread, whole-wheat pasta and brown rice instead of white grains and rice.   Get adequate Calcium and Vitamin D.     Lifestyle  Exercise for at least 150 minutes a week (30 minutes a day, 5 days a week). This will help you control your weight and prevent disease.   Limit alcohol to one drink per day.   No smoking.   Wear sunscreen to prevent skin cancer.   See your dentist every six months for an exam and cleaning.

## 2023-07-31 ENCOUNTER — TELEPHONE (OUTPATIENT)
Dept: FAMILY MEDICINE | Facility: CLINIC | Age: 38
End: 2023-07-31
Payer: COMMERCIAL

## 2023-07-31 NOTE — TELEPHONE ENCOUNTER
Please call patient and schedule follow-up office visit with me for blood pressure check.  Was started on blood pressure medication at last appointment and was to enroll in RN blood pressure management program however, that program will not be going live for some time yet.  Needs to follow-up with me.  Okay to double book on my schedule at a time whenever works best for him within the next 2 weeks.    Rianna Castañeda NP-C

## 2023-08-02 ENCOUNTER — ANCILLARY PROCEDURE (OUTPATIENT)
Dept: ULTRASOUND IMAGING | Facility: CLINIC | Age: 38
End: 2023-08-02
Attending: NURSE PRACTITIONER
Payer: COMMERCIAL

## 2023-08-02 DIAGNOSIS — F10.10 ALCOHOL ABUSE: Primary | ICD-10-CM

## 2023-08-02 DIAGNOSIS — K76.0 FATTY LIVER: ICD-10-CM

## 2023-08-02 DIAGNOSIS — F10.10 ALCOHOL ABUSE: ICD-10-CM

## 2023-08-02 PROCEDURE — 76705 ECHO EXAM OF ABDOMEN: CPT | Mod: TC | Performed by: RADIOLOGY

## 2023-08-03 NOTE — CONFIDENTIAL NOTE
DIAGNOSIS:    Alcohol abuse   Fatty liver      Appt Date: 08.07.2023   NOTES STATUS DETAILS   OFFICE NOTE from referring provider Internal 08.02.2023 Rianna Castañeda, MEDHAT CNP    OFFICE NOTES from other specialists     DISCHARGE SUMMARY from hospital     MEDICATION LIST Internal    LIVER BIOSPY (IF APPLICABLE)      PATHOLOGY REPORTS      IMAGING     ENDOSCOPY (IF AVAILABLE)     COLONOSCOPY (IF AVAILABLE)     ULTRASOUND LIVER Internal 08.02.2023, 01.19.2021 US ABDOMEN LIMITED    CT OF ABDOMEN     MRI OF LIVER     FIBROSCAN, US ELASTOGRAPHY, FIBROSIS SCAN, MR ELASTOGRAPHY     LABS     HEPATIC PANEL (LIVER PANEL)     BASIC METABOLIC PANEL     COMPLETE METABOLIC PANEL     COMPLETE BLOOD COUNT (CBC)     INTERNATIONAL NORMALIZED RATIO (INR)     HEPATITIS C ANTIBODY Internal 07.13.2023   HEPATITIS C VIRAL LOAD/PCR     HEPATITIS C GENOTYPE     HEPATITIS B SURFACE ANTIGEN     HEPATITIS B SURFACE ANTIBODY     HEPATITIS B DNA QUANT LEVEL     HEPATITIS B CORE ANTIBODY

## 2023-08-04 ENCOUNTER — OFFICE VISIT (OUTPATIENT)
Dept: OPTOMETRY | Facility: CLINIC | Age: 38
End: 2023-08-04
Payer: COMMERCIAL

## 2023-08-04 DIAGNOSIS — H52.223 REGULAR ASTIGMATISM OF BOTH EYES: ICD-10-CM

## 2023-08-04 DIAGNOSIS — Z01.01 ENCOUNTER FOR EXAMINATION OF EYES AND VISION WITH ABNORMAL FINDINGS: Primary | ICD-10-CM

## 2023-08-04 DIAGNOSIS — H52.13 MYOPIA OF BOTH EYES: ICD-10-CM

## 2023-08-04 DIAGNOSIS — H40.053 OCULAR HYPERTENSION, BILATERAL: ICD-10-CM

## 2023-08-04 PROCEDURE — 92015 DETERMINE REFRACTIVE STATE: CPT | Performed by: OPTOMETRIST

## 2023-08-04 PROCEDURE — 92004 COMPRE OPH EXAM NEW PT 1/>: CPT | Performed by: OPTOMETRIST

## 2023-08-04 ASSESSMENT — REFRACTION_MANIFEST
OS_SPHERE: -1.00
OD_CYLINDER: +0.25
OD_CYLINDER: +0.50
OS_AXIS: 150
OS_CYLINDER: +0.50
OD_AXIS: 058
OD_SPHERE: PLANO
OS_SPHERE: -1.00
OD_AXIS: 047
OS_CYLINDER: +0.50
OS_AXIS: 129
OD_SPHERE: -0.25
METHOD_AUTOREFRACTION: 1

## 2023-08-04 ASSESSMENT — CONF VISUAL FIELD
METHOD: COUNTING FINGERS
OS_SUPERIOR_NASAL_RESTRICTION: 0
OD_INFERIOR_NASAL_RESTRICTION: 0
OD_SUPERIOR_TEMPORAL_RESTRICTION: 0
OS_INFERIOR_TEMPORAL_RESTRICTION: 0
OS_INFERIOR_NASAL_RESTRICTION: 0
OS_SUPERIOR_TEMPORAL_RESTRICTION: 0
OS_NORMAL: 1
OD_SUPERIOR_NASAL_RESTRICTION: 0
OD_NORMAL: 1
OD_INFERIOR_TEMPORAL_RESTRICTION: 0

## 2023-08-04 ASSESSMENT — VISUAL ACUITY
OS_SC: 20/20
OS_SC: 20/20
METHOD: SNELLEN - LINEAR
OD_SC: 20/20-1
OD_SC: 20/20

## 2023-08-04 ASSESSMENT — SLIT LAMP EXAM - LIDS
COMMENTS: NORMAL
COMMENTS: NORMAL

## 2023-08-04 ASSESSMENT — KERATOMETRY
OS_AXISANGLE_DEGREES: 135
OD_K2POWER_DIOPTERS: 42.75
OS_K2POWER_DIOPTERS: 42.50
OD_AXISANGLE2_DEGREES: 064
OD_AXISANGLE_DEGREES: 154
OD_K1POWER_DIOPTERS: 41.50
OS_K1POWER_DIOPTERS: 442.00
OS_AXISANGLE2_DEGREES: 045

## 2023-08-04 ASSESSMENT — TONOMETRY
OD_IOP_MMHG: 31
OD_IOP_MMHG: 30
OS_IOP_MMHG: 29
IOP_METHOD: APPLANATION
OS_IOP_MMHG: 30
IOP_METHOD: APPLANATION

## 2023-08-04 ASSESSMENT — EXTERNAL EXAM - RIGHT EYE: OD_EXAM: NORMAL

## 2023-08-04 ASSESSMENT — CUP TO DISC RATIO
OD_RATIO: 0.2
OS_RATIO: 0.25

## 2023-08-04 ASSESSMENT — EXTERNAL EXAM - LEFT EYE: OS_EXAM: NORMAL

## 2023-08-04 NOTE — PATIENT INSTRUCTIONS
Elevated intraocular pressures bilaterally.   Healthy optic nerve appearance.    Return for baseline glaucoma testing with ophthalmology.     No glasses prescription necessary.      The effects of the dilating drops last for 4- 6 hours.  You will be more sensitive to light and vision will be blurry up close.  Mydriatic sunglasses were given if needed.     Josue Shea, OD  [address] [place]  [address] [phone]

## 2023-08-04 NOTE — PROGRESS NOTES
Chief Complaint   Patient presents with    Annual Eye Exam         Last Eye Exam: 20+ yrs   Dilated Previously: Yes, side effects of dilation explained today    What are you currently using to see?  does not use glasses or contacts       Distance Vision Acuity: Satisfied with vision    Near Vision Acuity: Satisfied with vision while reading and using computer unaided    Eye Comfort: good - occasional itching with allergies  Do you use eye drops? 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       OBJECTIVE: See Ophthalmology exam    ASSESSMENT:    ICD-10-CM    1. Encounter for examination of eyes and vision with abnormal findings  Z01.01       2. Ocular hypertension, bilateral  H40.053       3. Myopia of both eyes  H52.13       4. Regular astigmatism of both eyes  H52.223           PLAN:     Patient Instructions   Elevated intraocular pressures bilaterally.   Healthy optic nerves.     Return for baseline glaucoma testing with ophthalmology.     No glasses prescription necessary.      The effects of the dilating drops last for 4- 6 hours.  You will be more sensitive to light and vision will be blurry up close.  Mydriatic sunglasses were given if needed.     Josue Shea, OD  [address] [place]  [address]    [phone]

## 2023-08-04 NOTE — LETTER
8/4/2023         RE: Noé Knight  [id number] [facility] [address]        Dear Colleague,    Thank you for referring your patient, Noé Knight, to the [facility]. Please see a copy of my visit note below.    Chief Complaint   Patient presents with     Annual Eye Exam         Last Eye Exam: 20+ yrs   Dilated Previously: Yes, side effects of dilation explained today    What are you currently using to see?  does not use glasses or contacts       Distance Vision Acuity: Satisfied with vision    Near Vision Acuity: Satisfied with vision while reading and using computer unaided    Eye Comfort: good - occasional itching with allergies  Do you use eye drops? 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       OBJECTIVE: See Ophthalmology exam    ASSESSMENT:    ICD-10-CM    1. Encounter for examination of eyes and vision with abnormal findings  Z01.01       2. Ocular hypertension, bilateral  H40.053       3. Myopia of both eyes  H52.13       4. Regular astigmatism of both eyes  H52.223           PLAN:     Patient Instructions   Elevated intraocular pressures bilaterally.   Healthy optic nerves.     Return for baseline glaucoma testing with ophthalmology.     No glasses prescription necessary.      The effects of the dilating drops last for 4- 6 hours.  You will be more sensitive to light and vision will be blurry up close.  Mydriatic sunglasses were given if needed.     Josue Shea, OD  [facility]  [address] [place] [address]  [place]  [address]    [phone]       Again, thank you for allowing me to participate in the care of your patient.        Sincerely,        Josue Shea, OD

## 2023-08-07 ENCOUNTER — OFFICE VISIT (OUTPATIENT)
Dept: GASTROENTEROLOGY | Facility: CLINIC | Age: 38
End: 2023-08-07
Attending: NURSE PRACTITIONER
Payer: COMMERCIAL

## 2023-08-07 ENCOUNTER — PRE VISIT (OUTPATIENT)
Dept: GASTROENTEROLOGY | Facility: CLINIC | Age: 38
End: 2023-08-07

## 2023-08-07 ENCOUNTER — TRANSFERRED RECORDS (OUTPATIENT)
Dept: HEALTH INFORMATION MANAGEMENT | Facility: CLINIC | Age: 38
End: 2023-08-07

## 2023-08-07 ENCOUNTER — LAB (OUTPATIENT)
Dept: LAB | Facility: CLINIC | Age: 38
End: 2023-08-07
Payer: COMMERCIAL

## 2023-08-07 VITALS
OXYGEN SATURATION: 97 % | WEIGHT: 294.38 LBS | SYSTOLIC BLOOD PRESSURE: 146 MMHG | HEART RATE: 84 BPM | DIASTOLIC BLOOD PRESSURE: 97 MMHG | BODY MASS INDEX: 41.06 KG/M2

## 2023-08-07 DIAGNOSIS — F10.10 ALCOHOL ABUSE: ICD-10-CM

## 2023-08-07 DIAGNOSIS — K76.0 FATTY LIVER: Primary | ICD-10-CM

## 2023-08-07 DIAGNOSIS — I10 BENIGN ESSENTIAL HYPERTENSION: ICD-10-CM

## 2023-08-07 DIAGNOSIS — K76.0 FATTY LIVER: ICD-10-CM

## 2023-08-07 LAB
ALBUMIN SERPL BCG-MCNC: 4.6 G/DL (ref 3.5–5.2)
ALP SERPL-CCNC: 110 U/L (ref 40–129)
ALT SERPL W P-5'-P-CCNC: 39 U/L (ref 0–70)
AST SERPL W P-5'-P-CCNC: 27 U/L (ref 0–45)
BILIRUB DIRECT SERPL-MCNC: <0.2 MG/DL (ref 0–0.3)
BILIRUB SERPL-MCNC: 0.4 MG/DL
ERYTHROCYTE [DISTWIDTH] IN BLOOD BY AUTOMATED COUNT: 10.8 % (ref 10–15)
FERRITIN SERPL-MCNC: 400 NG/ML (ref 31–409)
HBV CORE AB SERPL QL IA: NONREACTIVE
HBV SURFACE AB SERPL IA-ACNC: >1000 M[IU]/ML
HBV SURFACE AB SERPL IA-ACNC: REACTIVE M[IU]/ML
HBV SURFACE AG SERPL QL IA: NONREACTIVE
HCT VFR BLD AUTO: 45.6 % (ref 40–53)
HGB BLD-MCNC: 16.3 G/DL (ref 13.3–17.7)
INR PPP: 1.06 (ref 0.85–1.15)
IRON BINDING CAPACITY (ROCHE): 262 UG/DL (ref 240–430)
IRON SATN MFR SERPL: 29 % (ref 15–46)
IRON SERPL-MCNC: 77 UG/DL (ref 61–157)
IRON SERPL-MCNC: 77 UG/DL (ref 61–157)
MCH RBC QN AUTO: 34.3 PG (ref 26.5–33)
MCHC RBC AUTO-ENTMCNC: 35.7 G/DL (ref 31.5–36.5)
MCV RBC AUTO: 96 FL (ref 78–100)
PLATELET # BLD AUTO: 292 10E3/UL (ref 150–450)
PROT SERPL-MCNC: 7.3 G/DL (ref 6.4–8.3)
RBC # BLD AUTO: 4.75 10E6/UL (ref 4.4–5.9)
WBC # BLD AUTO: 8.6 10E3/UL (ref 4–11)

## 2023-08-07 PROCEDURE — 85610 PROTHROMBIN TIME: CPT | Performed by: PATHOLOGY

## 2023-08-07 PROCEDURE — 82248 BILIRUBIN DIRECT: CPT | Performed by: PATHOLOGY

## 2023-08-07 PROCEDURE — 83550 IRON BINDING TEST: CPT | Performed by: PATHOLOGY

## 2023-08-07 PROCEDURE — 80053 COMPREHEN METABOLIC PANEL: CPT | Performed by: PATHOLOGY

## 2023-08-07 PROCEDURE — 36415 COLL VENOUS BLD VENIPUNCTURE: CPT | Performed by: PATHOLOGY

## 2023-08-07 PROCEDURE — G0463 HOSPITAL OUTPT CLINIC VISIT: HCPCS | Performed by: PHYSICIAN ASSISTANT

## 2023-08-07 PROCEDURE — 85027 COMPLETE CBC AUTOMATED: CPT | Performed by: PATHOLOGY

## 2023-08-07 PROCEDURE — 87340 HEPATITIS B SURFACE AG IA: CPT | Performed by: PHYSICIAN ASSISTANT

## 2023-08-07 PROCEDURE — 83540 ASSAY OF IRON: CPT | Performed by: PATHOLOGY

## 2023-08-07 PROCEDURE — 86706 HEP B SURFACE ANTIBODY: CPT | Performed by: PHYSICIAN ASSISTANT

## 2023-08-07 PROCEDURE — 99204 OFFICE O/P NEW MOD 45 MIN: CPT | Mod: 25 | Performed by: PHYSICIAN ASSISTANT

## 2023-08-07 PROCEDURE — 86704 HEP B CORE ANTIBODY TOTAL: CPT | Performed by: PHYSICIAN ASSISTANT

## 2023-08-07 PROCEDURE — 82728 ASSAY OF FERRITIN: CPT | Performed by: PATHOLOGY

## 2023-08-07 PROCEDURE — 99000 SPECIMEN HANDLING OFFICE-LAB: CPT | Performed by: PATHOLOGY

## 2023-08-07 PROCEDURE — 91200 LIVER ELASTOGRAPHY: CPT | Mod: 26 | Performed by: PHYSICIAN ASSISTANT

## 2023-08-07 ASSESSMENT — PAIN SCALES - GENERAL: PAINLEVEL: NO PAIN (0)

## 2023-08-07 NOTE — LETTER
8/7/2023         RE: Noé Knight  [address] [facility] [address]        Dear Colleague,    Thank you for referring your patient, Noé Knight, to the [facility] [place]. Please see a copy of my visit note below.    Hepatology Clinic note  Noé Knight   Date of Birth 1985  Date of Service 8/6/2023    REASON FOR CONSULTATION: hepatomegaly   REFERRING PROVIDER:MEDHAT Garcia CNP          Assessment/plan:   Noé Knihgt is a 37 year old male with history of elevated ALT/AST and imaging findings of hepatomegaly. Risk factors for fatty liver disease includes: obesity, HTN, dyslipidemia, dyslipidemia and heavy alcohol use.  Liver function also normal without stigmata of advanced liver disease.     #Metabolic associated fatty liver disease (MALFD):   - Patient has multiple risk factors making them at risk for fibrosis over time.   - Will obtain a fibrosis scan to evaluate any fibrosis. FibroScan today showing Stage 0-1, 5.8 kilopascals and Grade 3 Steatosis   - Recommend FIB-4 yearly with PCP. Computed FIB-4 Calculation unavailable.FIB-4 Calculation: 0.56 at 8/7/2023  9:43 AM  - Recommend slow gradual weight loss.  - Maintain good control of cholesterol (No contraindication to starting a statin with LFT elevations)   - Optimize blood glucose as needed. Could consider GLP-1 inhibitor for both insulin resistance and help with weight loss  - Improve nutrition: limiting carbohydrates, especially simple carbohydrates, and following Mediterranean eating patten  - Continue physical activity as able, ideally 150 minutes weekly  - Limit alcohol intake to not more than 3 drinks a week    # Will also rule out genetic and viral of hepatobiliary disease.   Labs: Hep B, Hep C, Iron panel pending today     # Repeat labs in six months, if still elevated, will check TTG/IgA, JULES, F-actin     # Consider follow up with non-invasive elastography or FibroScan in 3-5  years     - Follow up with Hepatology as needed      Yolanda Villatoro PA-C   [facility] Hepatology     Total time for E/M services performed on the date of the encounter 45 minutes; excluding performing and official interpretation of fibrosis scan reads.  This included review of previous: clinic visits, hospital records, lab results, imaging studies, and procedural documentation. Time also includes patient visit, documentation. The findings from this review are summarized in the above note.       -----------------------------------------------------       HPI:   Noé Knight is a 37 year old male  presenting for the evaluation of elevated LFT's.     LFT's were first noticed to be abnormal in with routine clinic visit at which time AST was 48 and ALT 60. No previously labs available for comparison. Had RUQ discomfort last year and had an ultrasound at which time it showed hepatic steatosis and hepatomegaly.     Saw primary care recently, started on blood pressure medications. In the past month, started decreasing sodium in diet as well has reducing fat intake.     Historically, weight has been around 270 lbs and then increased during pandemic up to 295 lbs.   For activity, Ice's skates/hockey twice a week for an hour. Has regular bowel movements.     Patient denies jaundice, lower extremity edema, abdominal distension or confusion.      Patient also denies melena, hematochezia or hematemesis.    Patient denies weight loss, fevers, sweats or chills.    PMH:    has a past medical history of Esophageal stricture and Seasonal allergies. HTN, HLD    SMH:    has a past surgical history that includes wisdom teeth and Open reduction internal fixation rodding intramedullar femur fracture table.     Medications:   lisinopril  omeprazole     No previous tobacco use. In regards to alcohol, prior to pandemic, was drinking 3-4 days a day (ranging from 4-10 days)  During the pandemic, was drinking 6 days a week, 6-10  drinks at a time.    Down to 2 days (up to four drinks) on a social occasion.No previous IV/IN drug use.    Currently works Maintenance for [employer].  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   No known family history of liver disease or liver cancer.     Previous work-up:   Lab Results   Component Value Date    HCVAB Nonreactive 07/13/2023    TSH 0.71 07/13/2023    CHOL 228 (H) 07/13/2023    HDL 44 07/13/2023     (H) 07/13/2023    TRIG 162 (H) 07/13/2023    A1C 5.1 07/13/2023      No results found for: SPECDES, LDRESULTS    Recent Labs   Lab Test 07/13/23  0922   ALKPHOS 116   ALT 60   AST 48*          Allergies:     Allergies   Allergen Reactions    Seasonal Allergies             Social History:     Social History     Socioeconomic History    Marital status: Single     Spouse name: Not on file    Number of children: Not on file    Years of education: Not on file    Highest education level: Not on file   Occupational History    Occupation: [employer]   Tobacco Use    Smoking status: Never    Smokeless tobacco: Never   Substance and Sexual Activity    Alcohol use: Yes     Alcohol/week: 0.0 standard drinks of alcohol     Comment: social use only    Drug use: No    Sexual activity: Yes     Partners: Female   Other Topics Concern    Parent/sibling w/ CABG, MI or angioplasty before 65F 55M? Not Asked   Social History Narrative    Not on file     Social Determinants of Health     Financial Resource Strain: Not on file   Food Insecurity: Not on file   Transportation Needs: Not on file   Physical Activity: Not on file   Stress: Not on file   Social Connections: Not on file   Intimate Partner Violence: Not on file   Housing Stability: Not on file            Family History:     Family History   Problem Relation Age of Onset    Hypertension Father     Colon Cancer Maternal Grandmother     Glaucoma No family hx of     Macular Degeneration No family hx of             Review of Systems:   Gen: See HPI      HEENT: No change in vision or hearing, mouth sores, dysphagia, lymph nodes  Resp: No shortness of breath, coughing, hx of asthma  CV: No chest pain, palpitations, syncope   GI: See HPI  : No dysuria, history of stones, urine color    Skin: No rash; no pruritus or psoriasis  MS: No arthralgias, myalgias, joint swelling  Neuro: No memory changes, confusion, numbness    Heme: No difficulty clotting, bruising, bleeding  Psych:  No anxiety, depression, agitation          Physical Exam:   BP (!) 146/97   Pulse 84   Wt 133.5 kg (294 lb 6 oz)   SpO2 97%   BMI 41.06 kg/m      Gen: A&Ox3, NAD, well developed  HEENT: non-icteric   Lym- no palpable lymphadenopathy  Abd: soft, NT, ND no palpable splenomegaly, liver is not palpable.   Ext: no edema, intact pulses.   Skin: No rash, no palmar erythema, telangiectasias or jaundice  Neuro: grossly intact, no asterixis   Psych: appropriate mood and affects         Data:   Reviewed in person and significant for:    Lab Results   Component Value Date     07/13/2023      Lab Results   Component Value Date    POTASSIUM 4.9 07/13/2023     Lab Results   Component Value Date    CHLORIDE 104 07/13/2023     Lab Results   Component Value Date    CO2 26 07/13/2023     Lab Results   Component Value Date    BUN 10.6 07/13/2023     Lab Results   Component Value Date    CR 0.99 07/13/2023       No results found for: WBC  No results found for: HGB  No results found for: HCT  No results found for: MCV  No results found for: PLT    Lab Results   Component Value Date    AST 48 07/13/2023     Lab Results   Component Value Date    ALT 60 07/13/2023     No results found for: BILICONJ   Lab Results   Component Value Date    BILITOTAL 0.6 07/13/2023       Lab Results   Component Value Date    ALBUMIN 4.7 07/13/2023     Lab Results   Component Value Date    PROTTOTAL 7.7 07/13/2023      Lab Results   Component Value Date    ALKPHOS 116 07/13/2023       No results found for: INR      Imaging:         Narrative & Impression   US ABDOMEN LIMITED   8/2/2023 7:44 AM      HISTORY:  Alcohol abuse; Fatty liver     COMPARISON: 1/19/2021     FINDINGS:    Gallbladder: Normal with no cholelithiasis, wall thickening or focal  tenderness.       Bile ducts:  CHD is normal diameter.  No intrahepatic biliary  dilatation.     Liver: Enlarged measuring 18 cm in length with diffuse hepatic  steatosis.     Pancreas:  Partially obscured by overlying bowel gas,  but grossly  unremarkable.      Right kidney:  Normal.      Aorta and IVC:  Not specifically assessed.                                                                       IMPRESSION:  Hepatomegaly with diffuse hepatic steatosis.     MD Yolanda LUU PA-C

## 2023-08-07 NOTE — PROGRESS NOTES
Hepatology Clinic note  Noé Knight   Date of Birth 1985  Date of Service 8/6/2023    REASON FOR CONSULTATION: hepatomegaly   REFERRING PROVIDER:MEDHAT Garcia CNP          Assessment/plan:   Noé Knight is a 37 year old male with history of elevated ALT/AST and imaging findings of hepatomegaly. Risk factors for fatty liver disease includes: obesity, HTN, dyslipidemia, dyslipidemia and heavy alcohol use.  Liver function also normal without stigmata of advanced liver disease.     #Metabolic associated fatty liver disease (MALFD):   - Patient has multiple risk factors making them at risk for fibrosis over time.   - Will obtain a fibrosis scan to evaluate any fibrosis. FibroScan today showing Stage 0-1, 5.8 kilopascals and Grade 3 Steatosis   - Recommend FIB-4 yearly with PCP. Computed FIB-4 Calculation unavailable.FIB-4 Calculation: 0.56 at 8/7/2023  9:43 AM  - Recommend slow gradual weight loss.  - Maintain good control of cholesterol (No contraindication to starting a statin with LFT elevations)   - Optimize blood glucose as needed. Could consider GLP-1 inhibitor for both insulin resistance and help with weight loss  - Improve nutrition: limiting carbohydrates, especially simple carbohydrates, and following Mediterranean eating patten  - Continue physical activity as able, ideally 150 minutes weekly  - Limit alcohol intake to not more than 3 drinks a week    # Will also rule out genetic and viral of hepatobiliary disease.   Labs: Hep B, Hep C, Iron panel pending today     # Repeat labs in six months, if still elevated, will check TTG/IgA, JULES, F-actin     # Consider follow up with non-invasive elastography or FibroScan in 3-5 years     - Follow up with Hepatology as needed      Yolanda Villatoro PA-C   [facility] Hepatology     Total time for E/M services performed on the date of the encounter 45 minutes; excluding performing and official interpretation of fibrosis scan reads.  This  included review of previous: clinic visits, hospital records, lab results, imaging studies, and procedural documentation. Time also includes patient visit, documentation. The findings from this review are summarized in the above note.       -----------------------------------------------------       HPI:   Noé Knight is a 37 year old male  presenting for the evaluation of elevated LFT's.     LFT's were first noticed to be abnormal in with routine clinic visit at which time AST was 48 and ALT 60. No previously labs available for comparison. Had RUQ discomfort last year and had an ultrasound at which time it showed hepatic steatosis and hepatomegaly.     Saw primary care recently, started on blood pressure medications. In the past month, started decreasing sodium in diet as well has reducing fat intake.     Historically, weight has been around 270 lbs and then increased during pandemic up to 295 lbs.   For activity, Ice's skates/hockey twice a week for an hour. Has regular bowel movements.     Patient denies jaundice, lower extremity edema, abdominal distension or confusion.      Patient also denies melena, hematochezia or hematemesis.    Patient denies weight loss, fevers, sweats or chills.    PMH:    has a past medical history of Esophageal stricture and Seasonal allergies. HTN, HLD    SMH:    has a past surgical history that includes wisdom teeth and Open reduction internal fixation rodding intramedullar femur fracture table.     Medications:   lisinopril  omeprazole     No previous tobacco use. In regards to alcohol, prior to pandemic, was drinking 3-4 days a day (ranging from 4-10 days)  During the pandemic, was drinking 6 days a week, 6-10 drinks at a time.    Down to 2 days (up to four drinks) on a social occasion.No previous IV/IN drug use.    Currently works Maintenance for [employer].  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   No known family history of liver disease or liver cancer.     Previous  work-up:   Lab Results   Component Value Date    HCVAB Nonreactive 07/13/2023    TSH 0.71 07/13/2023    CHOL 228 (H) 07/13/2023    HDL 44 07/13/2023     (H) 07/13/2023    TRIG 162 (H) 07/13/2023    A1C 5.1 07/13/2023      No results found for: SPECDES, LDRESULTS    Recent Labs   Lab Test 07/13/23  0922   ALKPHOS 116   ALT 60   AST 48*          Allergies:     Allergies   Allergen Reactions    Seasonal Allergies             Social History:     Social History     Socioeconomic History    Marital status: Single     Spouse name: Not on file    Number of children: Not on file    Years of education: Not on file    Highest education level: Not on file   Occupational History    Occupation: [employer]   Tobacco Use    Smoking status: Never    Smokeless tobacco: Never   Substance and Sexual Activity    Alcohol use: Yes     Alcohol/week: 0.0 standard drinks of alcohol     Comment: social use only    Drug use: No    Sexual activity: Yes     Partners: Female   Other Topics Concern    Parent/sibling w/ CABG, MI or angioplasty before 65F 55M? Not Asked   Social History Narrative    Not on file     Social Determinants of Health     Financial Resource Strain: Not on file   Food Insecurity: Not on file   Transportation Needs: Not on file   Physical Activity: Not on file   Stress: Not on file   Social Connections: Not on file   Intimate Partner Violence: Not on file   Housing Stability: Not on file            Family History:     Family History   Problem Relation Age of Onset    Hypertension Father     Colon Cancer Maternal Grandmother     Glaucoma No family hx of     Macular Degeneration No family hx of             Review of Systems:   Gen: See HPI     HEENT: No change in vision or hearing, mouth sores, dysphagia, lymph nodes  Resp: No shortness of breath, coughing, hx of asthma  CV: No chest pain, palpitations, syncope   GI: See HPI  : No dysuria, history of stones, urine color    Skin: No rash; no pruritus or  psoriasis  MS: No arthralgias, myalgias, joint swelling  Neuro: No memory changes, confusion, numbness    Heme: No difficulty clotting, bruising, bleeding  Psych:  No anxiety, depression, agitation          Physical Exam:   BP (!) 146/97   Pulse 84   Wt 133.5 kg (294 lb 6 oz)   SpO2 97%   BMI 41.06 kg/m      Gen: A&Ox3, NAD, well developed  HEENT: non-icteric   Lym- no palpable lymphadenopathy  Abd: soft, NT, ND no palpable splenomegaly, liver is not palpable.   Ext: no edema, intact pulses.   Skin: No rash, no palmar erythema, telangiectasias or jaundice  Neuro: grossly intact, no asterixis   Psych: appropriate mood and affects         Data:   Reviewed in person and significant for:    Lab Results   Component Value Date     07/13/2023      Lab Results   Component Value Date    POTASSIUM 4.9 07/13/2023     Lab Results   Component Value Date    CHLORIDE 104 07/13/2023     Lab Results   Component Value Date    CO2 26 07/13/2023     Lab Results   Component Value Date    BUN 10.6 07/13/2023     Lab Results   Component Value Date    CR 0.99 07/13/2023       No results found for: WBC  No results found for: HGB  No results found for: HCT  No results found for: MCV  No results found for: PLT    Lab Results   Component Value Date    AST 48 07/13/2023     Lab Results   Component Value Date    ALT 60 07/13/2023     No results found for: BILICONJ   Lab Results   Component Value Date    BILITOTAL 0.6 07/13/2023       Lab Results   Component Value Date    ALBUMIN 4.7 07/13/2023     Lab Results   Component Value Date    PROTTOTAL 7.7 07/13/2023      Lab Results   Component Value Date    ALKPHOS 116 07/13/2023       No results found for: INR      Imaging:        Narrative & Impression   US ABDOMEN LIMITED   8/2/2023 7:44 AM      HISTORY:  Alcohol abuse; Fatty liver     COMPARISON: 1/19/2021     FINDINGS:    Gallbladder: Normal with no cholelithiasis, wall thickening or focal  tenderness.       Bile ducts:  CHD is normal  diameter.  No intrahepatic biliary  dilatation.     Liver: Enlarged measuring 18 cm in length with diffuse hepatic  steatosis.     Pancreas:  Partially obscured by overlying bowel gas,  but grossly  unremarkable.      Right kidney:  Normal.      Aorta and IVC:  Not specifically assessed.                                                                       IMPRESSION:  Hepatomegaly with diffuse hepatic steatosis.     IFTIKHAR GUARDADO MD

## 2023-08-07 NOTE — PROGRESS NOTES
{PROVIDER CHARTING PREFERENCE:841836}    Subjective   Dominick is a 37 year old, presenting for the following health issues:  No chief complaint on file.  {(!) Visit Details have not yet been documented.  Please enter Visit Details and then use this list to pull in documentation. (Optional):229521}    HPI     {SUPERLIST (Optional):610998}  {additonal problems for provider to add (Optional):364136}      Review of Systems   {ROS COMP (Optional):437928}      Objective    There were no vitals taken for this visit.  There is no height or weight on file to calculate BMI.  Physical Exam   {Exam List (Optional):357391}    {Diagnostic Test Results (Optional):117943}    {AMBULATORY ATTESTATION (Optional):491491}

## 2023-08-09 ENCOUNTER — OFFICE VISIT (OUTPATIENT)
Dept: FAMILY MEDICINE | Facility: CLINIC | Age: 38
End: 2023-08-09
Payer: COMMERCIAL

## 2023-08-09 VITALS
WEIGHT: 294.4 LBS | RESPIRATION RATE: 16 BRPM | SYSTOLIC BLOOD PRESSURE: 130 MMHG | HEART RATE: 73 BPM | OXYGEN SATURATION: 99 % | TEMPERATURE: 97.7 F | BODY MASS INDEX: 39.88 KG/M2 | HEIGHT: 72 IN | DIASTOLIC BLOOD PRESSURE: 98 MMHG

## 2023-08-09 DIAGNOSIS — F10.10 ALCOHOL ABUSE: ICD-10-CM

## 2023-08-09 DIAGNOSIS — E78.5 HYPERLIPIDEMIA LDL GOAL <100: Primary | ICD-10-CM

## 2023-08-09 DIAGNOSIS — K76.0 FATTY LIVER: ICD-10-CM

## 2023-08-09 DIAGNOSIS — I10 BENIGN ESSENTIAL HYPERTENSION: ICD-10-CM

## 2023-08-09 LAB
ANION GAP SERPL CALCULATED.3IONS-SCNC: 14 MMOL/L (ref 7–15)
BUN SERPL-MCNC: 14.3 MG/DL (ref 6–20)
CALCIUM SERPL-MCNC: 9.6 MG/DL (ref 8.6–10)
CHLORIDE SERPL-SCNC: 108 MMOL/L (ref 98–107)
CREAT SERPL-MCNC: 1.13 MG/DL (ref 0.67–1.17)
DEPRECATED HCO3 PLAS-SCNC: 24 MMOL/L (ref 22–29)
GFR SERPL CREATININE-BSD FRML MDRD: 85 ML/MIN/1.73M2
GLUCOSE SERPL-MCNC: 102 MG/DL (ref 70–99)
POTASSIUM SERPL-SCNC: 4.9 MMOL/L (ref 3.4–5.3)
SODIUM SERPL-SCNC: 146 MMOL/L (ref 136–145)

## 2023-08-09 PROCEDURE — 99213 OFFICE O/P EST LOW 20 MIN: CPT | Performed by: NURSE PRACTITIONER

## 2023-08-09 RX ORDER — LISINOPRIL 40 MG/1
40 TABLET ORAL DAILY
Qty: 90 TABLET | Refills: 0 | Status: SHIPPED | OUTPATIENT
Start: 2023-08-09 | End: 2023-11-09

## 2023-08-09 ASSESSMENT — ENCOUNTER SYMPTOMS
SLEEP DISTURBANCE: 0
FATIGUE: 0
HEADACHES: 0
WHEEZING: 0
DIARRHEA: 0
NERVOUS/ANXIOUS: 0
SINUS PRESSURE: 0
ABDOMINAL PAIN: 0
FREQUENCY: 0
RHINORRHEA: 0
DIZZINESS: 0
NUMBNESS: 0
DYSPHORIC MOOD: 0
SHORTNESS OF BREATH: 0
LIGHT-HEADEDNESS: 0
PALPITATIONS: 0
JOINT SWELLING: 0
DYSURIA: 0
SORE THROAT: 0
ARTHRALGIAS: 0
CONSTIPATION: 0
COUGH: 0

## 2023-08-09 NOTE — PROGRESS NOTES
"  Assessment & Plan     Benign essential hypertension  Blood pressure remains slightly elevated.  Will increase dose of lisinopril from 20 mg to 40 mg daily.  Recheck lab.  Schedule virtual appointment in 2 to 4 weeks for recheck of blood pressure.  - lisinopril (ZESTRIL) 40 MG tablet; Take 1 tablet (40 mg) by mouth daily  - Basic metabolic panel; Future    Hyperlipidemia LDL goal <100  Previous lipids reviewed.  Will arrange for fasting lab appointment in 6 months for recheck.  - Lipid panel reflex to direct LDL Fasting; Future    Alcohol abuse  Congratulated on decreasing alcohol intake.    Fatty liver  Previous hepatology notes reviewed.    Review of the result(s) of each unique test - Labs  Ordering of each unique test  Prescription drug management     BMI:   Estimated body mass index is 40.31 kg/m  as calculated from the following:    Height as of this encounter: 1.82 m (5' 11.65\").    Weight as of this encounter: 133.5 kg (294 lb 6.4 oz).       MEDHAT [facility] LISETH Tan is a 37 year old, presenting for the following health issues:  RECHECK        8/9/2023    10:42 AM   Additional Questions   Roomed by MP   Accompanied by NA         8/9/2023    10:42 AM   Patient Reported Additional Medications   Patient reports taking the following new medications None per patient       History of Present Illness       Hypertension: He presents for follow up of hypertension.  He does check blood pressure  regularly outside of the clinic. Outside blood pressures have been over 140/90. He follows a low salt diet.           Here today for blood pressure recheck. I at last appointment was started on lisinopril.  When first started it just felt a bit off.  That has resolved and is tolerating medication well.  Is checking blood pressure at work and is getting similar to what got here today in clinic.     Met with hepatology.  Had some additional testing done.  Liver function has " "improved.  Has decreased alcohol intake.  Is only drinking twice per week.  Is not having any cravings.      Review of Systems   Constitutional:  Negative for fatigue.   HENT:  Negative for congestion, ear pain, rhinorrhea, sinus pressure and sore throat.    Respiratory:  Negative for cough, shortness of breath and wheezing.    Cardiovascular:  Negative for chest pain and palpitations.   Gastrointestinal:  Negative for abdominal pain, constipation and diarrhea.   Genitourinary:  Negative for dysuria and frequency.   Musculoskeletal:  Negative for arthralgias and joint swelling.   Skin:  Negative for rash.   Neurological:  Negative for dizziness, light-headedness, numbness and headaches.   Psychiatric/Behavioral:  Negative for dysphoric mood and sleep disturbance. The patient is not nervous/anxious.           Objective    BP (!) 138/94   Pulse 73   Temp 97.7  F (36.5  C) (Temporal)   Resp 16   Ht 1.82 m (5' 11.65\")   Wt 133.5 kg (294 lb 6.4 oz)   SpO2 99%   BMI 40.31 kg/m    Body mass index is 40.31 kg/m .  Physical Exam  Constitutional:       Appearance: He is well-developed.   Cardiovascular:      Rate and Rhythm: Normal rate and regular rhythm.      Heart sounds: Normal heart sounds.   Pulmonary:      Effort: Pulmonary effort is normal.      Breath sounds: Normal breath sounds.   Skin:     General: Skin is warm and dry.   Neurological:      Mental Status: He is alert.                        "

## 2023-08-09 NOTE — PATIENT INSTRUCTIONS
Good job on decreasing your alcohol intake.    Will increase your dose of lisinopril from 20 mg to 40 mg daily.  You can take 2 of the 20 mg tablets that you have available at home.  I did send a new prescription today for 40 mg tablets.    We will plan virtual appointment in 2 to 4 weeks for recheck of your blood pressure.    We will arrange for a fasting lab appointment in 6 months for recheck of your cholesterol.    Keep up the good work!

## 2023-10-05 ENCOUNTER — OFFICE VISIT (OUTPATIENT)
Dept: OPHTHALMOLOGY | Facility: CLINIC | Age: 38
End: 2023-10-05
Payer: COMMERCIAL

## 2023-10-05 DIAGNOSIS — H40.003 GLAUCOMA SUSPECT OF BOTH EYES: Primary | ICD-10-CM

## 2023-10-05 DIAGNOSIS — H40.053 OCULAR HYPERTENSION, BILATERAL: ICD-10-CM

## 2023-10-05 PROCEDURE — 92002 INTRM OPH EXAM NEW PATIENT: CPT | Performed by: STUDENT IN AN ORGANIZED HEALTH CARE EDUCATION/TRAINING PROGRAM

## 2023-10-05 PROCEDURE — 92133 CPTRZD OPH DX IMG PST SGM ON: CPT | Performed by: STUDENT IN AN ORGANIZED HEALTH CARE EDUCATION/TRAINING PROGRAM

## 2023-10-05 PROCEDURE — 92083 EXTENDED VISUAL FIELD XM: CPT | Performed by: STUDENT IN AN ORGANIZED HEALTH CARE EDUCATION/TRAINING PROGRAM

## 2023-10-05 PROCEDURE — 76514 ECHO EXAM OF EYE THICKNESS: CPT | Performed by: STUDENT IN AN ORGANIZED HEALTH CARE EDUCATION/TRAINING PROGRAM

## 2023-10-05 ASSESSMENT — SLIT LAMP EXAM - LIDS
COMMENTS: NORMAL
COMMENTS: NORMAL

## 2023-10-05 ASSESSMENT — VISUAL ACUITY
OS_SC: 20/20
METHOD: SNELLEN - LINEAR
OD_SC: 20/20

## 2023-10-05 ASSESSMENT — EXTERNAL EXAM - LEFT EYE: OS_EXAM: NORMAL

## 2023-10-05 ASSESSMENT — EXTERNAL EXAM - RIGHT EYE: OD_EXAM: NORMAL

## 2023-10-05 ASSESSMENT — TONOMETRY
OS_IOP_MMHG: 25
OD_IOP_MMHG: 25
IOP_METHOD: APPLANATION

## 2023-10-05 ASSESSMENT — CUP TO DISC RATIO
OD_RATIO: 0.2
OS_RATIO: 0.25

## 2023-10-05 ASSESSMENT — PACHYMETRY
OD_CT(UM): .653
OS_CT(UM): .616

## 2023-10-05 NOTE — PROGRESS NOTES
Current Eye Medications:  none     Subjective:  here for HVF/OCT/IOP/pachy for baseline testing, ref from Dr. Shea. With his allergies, he feels slight pain on the right. Like a clogged sinus feeling.     Objective:  See Ophthalmology Exam.       Assessment:  Noé Knight is a 37 year old male who presents with:   Encounter Diagnoses   Name Primary?    Glaucoma suspect of both eyes Intraocular pressure 25/25 with thick central corneal thickness (653/616). Monitor.    OCT optic nerve: avg retinal nerve fiber layer 95/87; within normal limits both eyes     Saleh visual field (HVF) 24-2: within normal limits both eyes     Ocular hypertension, bilateral        Plan:  Continue monitoring for glaucoma suspicion    Selwyn Arenas MD  [phone]

## 2023-10-05 NOTE — LETTER
10/5/2023         RE: Noé Knight  [address] [facility] [address]        Dear Colleague,    Thank you for referring your patient, Noé Knight, to the [facility]. Please see a copy of my visit note below.     Current Eye Medications:  none     Subjective:  here for HVF/OCT/IOP/pachy for baseline testing, ref from Dr. Shea. With his allergies, he feels slight pain on the right. Like a clogged sinus feeling.     Objective:  See Ophthalmology Exam.       Assessment:  Noé Knight is a 37 year old male who presents with:   Encounter Diagnoses   Name Primary?     Glaucoma suspect of both eyes Intraocular pressure 25/25 with thick central corneal thickness (653/616). Monitor.    OCT optic nerve: avg retinal nerve fiber layer 95/87; within normal limits both eyes     Saleh visual field (HVF) 24-2: within normal limits both eyes      Ocular hypertension, bilateral        Plan:  Continue monitoring for glaucoma suspicion    Selwyn Arenas MD  [phone]        Again, thank you for allowing me to participate in the care of your patient.        Sincerely,        Selwyn Arenas MD

## 2023-11-22 ENCOUNTER — OFFICE VISIT (OUTPATIENT)
Dept: FAMILY MEDICINE | Facility: CLINIC | Age: 38
End: 2023-11-22
Payer: COMMERCIAL

## 2023-11-22 VITALS
RESPIRATION RATE: 18 BRPM | OXYGEN SATURATION: 97 % | SYSTOLIC BLOOD PRESSURE: 134 MMHG | WEIGHT: 296.4 LBS | HEART RATE: 81 BPM | TEMPERATURE: 97.4 F | BODY MASS INDEX: 41.49 KG/M2 | HEIGHT: 71 IN | DIASTOLIC BLOOD PRESSURE: 86 MMHG

## 2023-11-22 DIAGNOSIS — I10 BENIGN ESSENTIAL HYPERTENSION: ICD-10-CM

## 2023-11-22 PROCEDURE — 90480 ADMN SARSCOV2 VAC 1/ONLY CMP: CPT | Performed by: PHYSICIAN ASSISTANT

## 2023-11-22 PROCEDURE — 90471 IMMUNIZATION ADMIN: CPT | Performed by: PHYSICIAN ASSISTANT

## 2023-11-22 PROCEDURE — 90686 IIV4 VACC NO PRSV 0.5 ML IM: CPT | Performed by: PHYSICIAN ASSISTANT

## 2023-11-22 PROCEDURE — 99213 OFFICE O/P EST LOW 20 MIN: CPT | Mod: 25 | Performed by: PHYSICIAN ASSISTANT

## 2023-11-22 PROCEDURE — 91320 SARSCV2 VAC 30MCG TRS-SUC IM: CPT | Performed by: PHYSICIAN ASSISTANT

## 2023-11-22 RX ORDER — LISINOPRIL 40 MG/1
40 TABLET ORAL DAILY
Qty: 90 TABLET | Refills: 1 | Status: SHIPPED | OUTPATIENT
Start: 2023-11-22 | End: 2024-05-29

## 2023-11-22 ASSESSMENT — PAIN SCALES - GENERAL: PAINLEVEL: NO PAIN (0)

## 2023-11-22 NOTE — PROGRESS NOTES
"    Subjective   Dominick is a 38 year old, presenting for the following health issues:  Recheck Medication      11/22/2023     7:45 AM   Additional Questions   Roomed by Teri   Accompanied by Self       History of Present Illness       Hypertension: He presents for follow up of hypertension.  He does check blood pressure  regularly outside of the clinic. Outpatient blood pressures have not been over 140/90. He follows a low salt diet.     He eats 2-3 servings of fruits and vegetables daily.He consumes 1 sweetened beverage(s) daily.He exercises with enough effort to increase his heart rate 10 to 19 minutes per day.  He exercises with enough effort to increase his heart rate 4 days per week.   He is taking medications regularly.       Hypertension Follow-up    Do you check your blood pressure regularly outside of the clinic? Yes   Are you following a low salt diet? Yes  Are your blood pressures ever more than 140 on the top number (systolic) OR more   than 90 on the bottom number (diastolic), for example 140/90? Yes    No chest pain/sob/palpitations/dizziness/ha's  Taking and tolerating lisinopril.      Review of Systems   Constitutional, HEENT, cardiovascular, pulmonary, GI, , musculoskeletal, neuro, skin, endocrine and psych systems are negative, except as otherwise noted.      Objective    /86   Pulse 81   Temp 97.4  F (36.3  C) (Temporal)   Resp 18   Ht 1.81 m (5' 11.26\")   Wt 134.4 kg (296 lb 6.4 oz)   SpO2 97%   BMI 41.04 kg/m    Body mass index is 41.04 kg/m .  Physical Exam   Eye exam - right eye normal lid, conjunctiva, cornea, pupil and fundus, left eye normal lid, conjunctiva, cornea, pupil and fundus.  Thyroid not palpable, not enlarged, no nodules detected.  CHEST:chest clear to IPPA, no tachypnea, retractions or cyanosis, and S1, S2 normal, no murmur, no gallop, rate regular.  Pulses normal.    Dominick was seen today for recheck medication.    Diagnoses and all orders for this visit:    Benign " essential hypertension  -     lisinopril (ZESTRIL) 40 MG tablet; Take 1 tablet (40 mg) by mouth daily    Other orders  -     INFLUENZA VACCINE IM > 6 MONTHS VALENT IIV4 (AFLURIA/FLUZONE)  -     COVID-19 12+ (2023-24) (PFIZER)      Mediterranean diet  DASH diet  More exercise   Recheck in 6 mos

## 2024-03-08 ENCOUNTER — LAB (OUTPATIENT)
Dept: LAB | Facility: CLINIC | Age: 39
End: 2024-03-08
Payer: COMMERCIAL

## 2024-03-08 DIAGNOSIS — E78.5 HYPERLIPIDEMIA LDL GOAL <100: ICD-10-CM

## 2024-03-08 DIAGNOSIS — K76.0 FATTY LIVER: ICD-10-CM

## 2024-03-08 LAB
ALBUMIN SERPL BCG-MCNC: 4 G/DL (ref 3.5–5.2)
ALP SERPL-CCNC: 100 U/L (ref 40–150)
ALT SERPL W P-5'-P-CCNC: 51 U/L (ref 0–70)
AST SERPL W P-5'-P-CCNC: 43 U/L (ref 0–45)
BILIRUB DIRECT SERPL-MCNC: <0.2 MG/DL (ref 0–0.3)
BILIRUB SERPL-MCNC: 0.4 MG/DL
CHOLEST SERPL-MCNC: 188 MG/DL
FASTING STATUS PATIENT QL REPORTED: YES
HDLC SERPL-MCNC: 36 MG/DL
LDLC SERPL CALC-MCNC: 126 MG/DL
NONHDLC SERPL-MCNC: 152 MG/DL
PROT SERPL-MCNC: 6.8 G/DL (ref 6.4–8.3)
TRIGL SERPL-MCNC: 131 MG/DL

## 2024-03-08 PROCEDURE — 80061 LIPID PANEL: CPT

## 2024-03-08 PROCEDURE — 36415 COLL VENOUS BLD VENIPUNCTURE: CPT

## 2024-03-08 PROCEDURE — 80076 HEPATIC FUNCTION PANEL: CPT

## 2024-05-29 DIAGNOSIS — I10 BENIGN ESSENTIAL HYPERTENSION: ICD-10-CM

## 2024-05-29 RX ORDER — LISINOPRIL 40 MG/1
40 TABLET ORAL DAILY
Qty: 90 TABLET | Refills: 1 | Status: SHIPPED | OUTPATIENT
Start: 2024-05-29

## 2024-06-13 ENCOUNTER — PATIENT OUTREACH (OUTPATIENT)
Dept: CARE COORDINATION | Facility: CLINIC | Age: 39
End: 2024-06-13
Payer: COMMERCIAL

## 2024-06-13 ENCOUNTER — MYC REFILL (OUTPATIENT)
Dept: FAMILY MEDICINE | Facility: CLINIC | Age: 39
End: 2024-06-13
Payer: COMMERCIAL

## 2024-06-13 DIAGNOSIS — I10 BENIGN ESSENTIAL HYPERTENSION: ICD-10-CM

## 2024-06-13 RX ORDER — LISINOPRIL 40 MG/1
40 TABLET ORAL DAILY
Qty: 90 TABLET | Refills: 1 | OUTPATIENT
Start: 2024-06-13

## 2024-06-27 ENCOUNTER — PATIENT OUTREACH (OUTPATIENT)
Dept: CARE COORDINATION | Facility: CLINIC | Age: 39
End: 2024-06-27
Payer: COMMERCIAL

## 2024-08-09 DIAGNOSIS — K21.9 GASTROESOPHAGEAL REFLUX DISEASE WITHOUT ESOPHAGITIS: ICD-10-CM

## 2024-08-25 ENCOUNTER — OFFICE VISIT (OUTPATIENT)
Dept: URGENT CARE | Facility: URGENT CARE | Age: 39
End: 2024-08-25
Payer: COMMERCIAL

## 2024-08-25 VITALS
SYSTOLIC BLOOD PRESSURE: 138 MMHG | TEMPERATURE: 98.3 F | WEIGHT: 315 LBS | DIASTOLIC BLOOD PRESSURE: 88 MMHG | BODY MASS INDEX: 43.89 KG/M2 | RESPIRATION RATE: 13 BRPM | OXYGEN SATURATION: 98 % | HEART RATE: 76 BPM

## 2024-08-25 DIAGNOSIS — H61.23 BILATERAL IMPACTED CERUMEN: Primary | ICD-10-CM

## 2024-08-25 PROCEDURE — 69209 REMOVE IMPACTED EAR WAX UNI: CPT | Mod: 50 | Performed by: FAMILY MEDICINE

## 2024-08-25 NOTE — PROGRESS NOTES
Assessment & Plan     Bilateral impacted cerumen  Differentials discussed in detail.  Bilateral impacted cerumen cleaned with saline irrigation by MA.  Home care discussed and recommended to avoid using Q-tips.  Return criteria to explain.  All questions answered.        Subjective   Dominick is a 38 year old, presenting for the following health issues:  Ear Fullness (Right ear plugged( tried to clean but still plugged))    HPI   Concern -   Onset: 2 days   Description: right ear plugged and tender   Intensity: moderate  Progression of Symptoms:  same  Accompanying Signs & Symptoms: none  Therapies tried and outcome: debrox       Review of Systems  Constitutional, HEENT, cardiovascular, pulmonary, gi and gu systems are negative, except as otherwise noted.      Objective    /88   Pulse 76   Temp 98.3  F (36.8  C)   Resp 13   Wt 143.8 kg (317 lb)   SpO2 98%   BMI 43.89 kg/m    Body mass index is 43.89 kg/m .  Physical Exam   GENERAL: alert and no distress  EYES: Eyes grossly normal to inspection, PERRL and conjunctivae and sclerae normal  HENT: normal cephalic/atraumatic, right ear: occluded with wax, left ear: occluded with wax, nose and mouth without ulcers or lesions, oropharynx clear, and oral mucous membranes moist  RESP: no wheezes  MS: no gross musculoskeletal defects noted, no edema  NEURO: Normal strength and tone, mentation intact and speech normal  PSYCH: mentation appears normal, affect normal/bright        Signed Electronically by: Gonzalo Erazo MD

## 2024-08-28 ENCOUNTER — OFFICE VISIT (OUTPATIENT)
Dept: URGENT CARE | Facility: URGENT CARE | Age: 39
End: 2024-08-28
Payer: COMMERCIAL

## 2024-08-28 VITALS
WEIGHT: 315 LBS | SYSTOLIC BLOOD PRESSURE: 149 MMHG | RESPIRATION RATE: 16 BRPM | OXYGEN SATURATION: 98 % | BODY MASS INDEX: 44.51 KG/M2 | TEMPERATURE: 98.3 F | HEART RATE: 83 BPM | DIASTOLIC BLOOD PRESSURE: 90 MMHG

## 2024-08-28 DIAGNOSIS — H66.91 RIGHT ACUTE OTITIS MEDIA: Primary | ICD-10-CM

## 2024-08-28 PROCEDURE — 99213 OFFICE O/P EST LOW 20 MIN: CPT | Performed by: STUDENT IN AN ORGANIZED HEALTH CARE EDUCATION/TRAINING PROGRAM

## 2024-08-28 RX ORDER — AMOXICILLIN 875 MG
875 TABLET ORAL 2 TIMES DAILY
Qty: 14 TABLET | Refills: 0 | Status: SHIPPED | OUTPATIENT
Start: 2024-08-28 | End: 2024-09-04

## 2024-08-28 NOTE — PROGRESS NOTES
ASSESSMENT & PLAN:   Diagnoses and all orders for this visit:  Right acute otitis media  -     amoxicillin (AMOXIL) 875 MG tablet; Take 1 tablet (875 mg) by mouth 2 times daily for 7 days.    Right ear pain x 1 day. Right AOM on exam. Amoxicillin x 7 days. Tylenol/ibuprofen as needed.    At the end of the encounter, I discussed results, diagnosis, medications. Discussed red flags for immediate return to clinic/ER, as well as indications for follow up if no improvement. Patient and/or caregiver understood and agreed to plan. Patient was stable for discharge.    There are no Patient Instructions on file for this visit.    No follow-ups on file.    ------------------------------------------------------------------------  SUBJECTIVE  History was obtained from patient.    Patient presents with:  Urgent Care  Otalgia: Right ear pain, it started out on last Friday basically plugged up, worse over the weekend, went to the clinic and they clean it out, this morning got up and plugged again, now the pain through the day     HPI  Noé Knight is a(n) 38 year old male presenting to urgent care for right ear feeling plugged x 5 days. Was using OTC earwax drops. He was seen in urgent care 3 days ago and had ear lavage. Symptoms improved, but right ear continued to feel plugged. Developed ear pain today. He had URI with congestion 1 week prior to the symptoms.    Review of Systems    Current Outpatient Medications   Medication Sig Dispense Refill    amoxicillin (AMOXIL) 875 MG tablet Take 1 tablet (875 mg) by mouth 2 times daily for 7 days. 14 tablet 0    lisinopril (ZESTRIL) 40 MG tablet TAKE ONE TABLET BY MOUTH ONCE DAILY 90 tablet 1    omeprazole (PRILOSEC) 20 MG DR capsule TAKE ONE CAPSULE BY MOUTH ONCE DAILY 90 capsule 3     Problem List:  2020-01: Obesity (BMI 35.0-39.9) with comorbidity (H)  2020-01: Alcohol abuse  2020-01: Gastroesophageal reflux disease, esophagitis presence not   specified  2020-01: Lipoma of skin  and subcutaneous tissue  2017-03: Non morbid obesity, unspecified obesity type  2015-12: Hyperlipidemia LDL goal <160    Allergies   Allergen Reactions    Seasonal Allergies          OBJECTIVE  Vitals:    08/28/24 1324   BP: (!) 149/90   BP Location: Left arm   Patient Position: Sitting   Cuff Size: Adult Large   Pulse: 83   Resp: 16   Temp: 98.3  F (36.8  C)   TempSrc: Oral   SpO2: 98%   Weight: 145.8 kg (321 lb 8 oz)     Physical Exam   GENERAL: healthy, alert, no acute distress.   PSYCH: mentation appears normal. Normal affect  HEAD: normocephalic, atraumatic.  EYE: PERRL. EOMs intact. No scleral injection bilaterally.   EAR: external ear normal. Bilateral ear canals normal and nonpainful. Right TM bulging and erythematous. Left TM intact, pearly, translucent without bulging.  NOSE: external nose atraumatic without lesions.  LUNGS: no increased work of breathing.     No results found for any visits on 08/28/24.

## 2024-09-04 ENCOUNTER — OFFICE VISIT (OUTPATIENT)
Dept: URGENT CARE | Facility: URGENT CARE | Age: 39
End: 2024-09-04
Payer: COMMERCIAL

## 2024-09-04 VITALS
RESPIRATION RATE: 17 BRPM | HEART RATE: 86 BPM | WEIGHT: 315 LBS | DIASTOLIC BLOOD PRESSURE: 93 MMHG | BODY MASS INDEX: 43.89 KG/M2 | SYSTOLIC BLOOD PRESSURE: 150 MMHG | OXYGEN SATURATION: 98 % | TEMPERATURE: 98.4 F

## 2024-09-04 DIAGNOSIS — H60.391 INFECTIVE OTITIS EXTERNA, RIGHT: Primary | ICD-10-CM

## 2024-09-04 DIAGNOSIS — H92.11 EAR DISCHARGE OF RIGHT EAR: ICD-10-CM

## 2024-09-04 PROCEDURE — 87107 FUNGI IDENTIFICATION MOLD: CPT | Performed by: PHYSICIAN ASSISTANT

## 2024-09-04 PROCEDURE — 99213 OFFICE O/P EST LOW 20 MIN: CPT | Performed by: PHYSICIAN ASSISTANT

## 2024-09-04 PROCEDURE — 87102 FUNGUS ISOLATION CULTURE: CPT | Performed by: PHYSICIAN ASSISTANT

## 2024-09-04 RX ORDER — ACETIC ACID 20.65 MG/ML
4 SOLUTION AURICULAR (OTIC) 4 TIMES DAILY
Qty: 15 ML | Refills: 0 | Status: CANCELLED | OUTPATIENT
Start: 2024-09-04 | End: 2024-09-11

## 2024-09-04 RX ORDER — CIPROFLOXACIN AND DEXAMETHASONE 3; 1 MG/ML; MG/ML
4 SUSPENSION/ DROPS AURICULAR (OTIC) 2 TIMES DAILY
Qty: 7.5 ML | Refills: 0 | Status: SHIPPED | OUTPATIENT
Start: 2024-09-04 | End: 2024-09-11

## 2024-09-04 NOTE — PATIENT INSTRUCTIONS
Start ciprodex drops twice daily  Tylenol or ibuprofen as needed for pain relief.  No Qtips in ear canal. You may clean drainage from external ear with Qtip.  Keep water out of ear until the infection is cleared.  May swim with ear plug if not painful.  Avoid submerging head in bath.  If fungal culture returns positive, return to clinic for evaluation before starting drops to confirm that your ear drum is intact

## 2024-09-04 NOTE — PROGRESS NOTES
Assessment & Plan     Ear discharge of right ear  - Fungus Culture,  skin, hair, or nail  - [place] REMOVAL IMPACTED CERUMEN IRRIGATION/LVG UNILAT    Infective otitis externa, right  - ciprofloxacin-dexAMETHasone (CIPRODEX) 0.3-0.1 % otic suspension; Place 4 drops into the right ear 2 times daily for 7 days.        We discussed using Ciprodex eardrops to treat external otitis media.  Symptoms are consistent with a fungal etiology but it is difficult to assess whether or not his eardrum is intact.  His hearing was improved post lavage and I suspect that it is intact but this could not be confirmed.  If culture returns positive for fungal elements, I recommend that he come back into clinic for evaluation to confirm that his eardrum appears to be intact before starting acetic acid drops.    Return in about 1 week (around 9/11/2024) for visit with primary care provider if not improving.     Pearl Avalos PA-C  [facility]    Subjective   Noé Knight is a 38 year old who presents for the following health issues     Patient presents with:  Otalgia: Right ear pain, fluid drainage, hearing trouble and clogged feeling. Pt was diagnosed with ear infection on Wednesday and prescribed amoxicillin, took the last dose this morning.       ALBERT Tan presents clinic today for evaluation of pain in his right ear.  11 days ago, he was seen in clinic diagnosed with a cerumen impaction.  His ear was lavaged and cerumen was cleared and symptoms improved.  He had no symptoms at all for 2 more days.  Then he developed significant pain in his right ear and return to clinic and was diagnosed with an ear infection.  He was started on amoxicillin.  He took his last dose this morning.  He states that his ear feels plugged his hearing fades in and out.  He has tried using auto insufflation to pop his ear but this seems to cause extra pain.  Some yellow clear discharge.    Review of Systems   ROS negative except as  stated above.      Objective    BP (!) 150/93 (BP Location: Left arm, Cuff Size: Adult Large)   Pulse 86   Temp 98.4  F (36.9  C) (Tympanic)   Resp 17   Wt 143.8 kg (317 lb)   SpO2 98%   BMI 43.89 kg/m    Physical Exam   GENERAL: alert and no distress  HENT: Right ear canal with light grey material with black specks. A sample was removed for culture.  Ear was lavaged by MA.  Tympanic membrane appears to be intact but this is difficult to with assess secondary to small pieces of debris resting against the eardrum.  Ear canal is erythematous, minimally edematous.  Left tympanic membrane gray, good cone of light, no effusion noted, ear canal pink without discharge or edema.    No results found for any visits on 09/04/24.

## 2024-09-13 ENCOUNTER — OFFICE VISIT (OUTPATIENT)
Dept: FAMILY MEDICINE | Facility: CLINIC | Age: 39
End: 2024-09-13
Payer: COMMERCIAL

## 2024-09-13 ENCOUNTER — TELEPHONE (OUTPATIENT)
Dept: FAMILY MEDICINE | Facility: CLINIC | Age: 39
End: 2024-09-13

## 2024-09-13 VITALS
OXYGEN SATURATION: 97 % | HEIGHT: 72 IN | BODY MASS INDEX: 42.66 KG/M2 | RESPIRATION RATE: 16 BRPM | WEIGHT: 315 LBS | SYSTOLIC BLOOD PRESSURE: 120 MMHG | TEMPERATURE: 97.6 F | DIASTOLIC BLOOD PRESSURE: 84 MMHG | HEART RATE: 90 BPM

## 2024-09-13 DIAGNOSIS — H60.391 INFECTIVE OTITIS EXTERNA, RIGHT: Primary | ICD-10-CM

## 2024-09-13 PROCEDURE — G2211 COMPLEX E/M VISIT ADD ON: HCPCS | Performed by: FAMILY MEDICINE

## 2024-09-13 PROCEDURE — 99213 OFFICE O/P EST LOW 20 MIN: CPT | Performed by: FAMILY MEDICINE

## 2024-09-13 RX ORDER — OXICONAZOLE NITRATE 10 MG/ML
LOTION TOPICAL
Qty: 30 ML | Refills: 0 | Status: SHIPPED | OUTPATIENT
Start: 2024-09-13 | End: 2024-09-14

## 2024-09-13 ASSESSMENT — PAIN SCALES - GENERAL: PAINLEVEL: NO PAIN (0)

## 2024-09-13 NOTE — TELEPHONE ENCOUNTER
[facility]ail Pharmacy Prior Authorization Team   Phone: [phone]    PRIOR AUTHORIZATION DENIED        Medication: OXISTAT 1 % EX LOTN  Insurance Company: [employer] - Phone [phone] Fax [phone]  Denial Date: 9/13/2024  Denial Reason(s): MUST HAVE AN FDA APPROVED DX - FORMULARY PRODUCTS FDA APPROVED FOR PROVIDED DX INCLUDE - ACETIC ACID 2% SOLN AND HYDROCORTISONE-ACETIC ACID 1%-2% DROPS      Appeal Information: IF THE PROVIDER WOULD LIKE TO APPEAL THIS DECISION PLEASE PROVIDE THE PA TEAM WITH A LETTER OF MEDICAL NECESSITY      Patient Notified: NO

## 2024-09-13 NOTE — PROGRESS NOTES
"  Assessment & Plan     (H60.391) Infective otitis externa, right  (primary encounter diagnosis)  Comment: Recurrent otitis externa, right side.  No response to topical antibiotics plus cortisone drops.  Ear canal culture shows Aspergillus, fungal infection.  On exam, eardrum appears to be intact.  Plan: DISCONTINUED: oxiconazole nitrate (OXISTAT) 1 %        external lotion        Originally prescribed antifungal solution, however this is quite expensive.  I talked with the pharmacy, advised OTC clotrimazole solution, 2 to 4 drops affected ear 4 times daily x 10 days.  He is to follow-up if symptoms do not improve.  If symptoms persist, would advise ENT referral.            BMI  Estimated body mass index is 43.15 kg/m  as calculated from the following:    Height as of this encounter: 1.83 m (6' 0.05\").    Weight as of this encounter: 144.5 kg (318 lb 9.6 oz).     Subjective   Dominick is a 38 year old, presenting for the following health issues:  RECHECK        9/13/2024    10:50 AM   Additional Questions   Roomed by Bel   Accompanied by n/a     HPI or did she    ED/UC Followup:    Facility:  [facility]   Date of visit: 9/4/2024  Reason for visit: otalgia  Current Status: ear had been draining a liquid brown color, now doesn't have much drainage, no pain any longer         Objective    /84   Pulse 90   Temp 97.6  F (36.4  C) (Temporal)   Resp 16   Ht 1.83 m (6' 0.05\")   Wt 144.5 kg (318 lb 9.6 oz)   SpO2 97%   BMI 43.15 kg/m    Body mass index is 43.15 kg/m .  Physical Exam   GENERAL: Healthy, alert and no distress  EYES: Eyes grossly normal to inspection, conjunctivae and sclerae normal  Heent: Right ear canal shows erythema, some wax and debris, your canal appears patent.  Right TM visualized, portion seen appears to be intact.  No facial pain, oral mucosa moist, posterior pharynx without erythema or exudate.   MS: No gross musculoskeletal defects noted, no edema  NEURO: " Normal strength and tone, mentation intact and speech normal  PSYCH: Mentation appears normal, affect normal/bright       Signed Electronically by: Fabiana Mock MD

## 2024-09-21 ENCOUNTER — HEALTH MAINTENANCE LETTER (OUTPATIENT)
Age: 39
End: 2024-09-21

## 2024-09-28 ENCOUNTER — MYC MEDICAL ADVICE (OUTPATIENT)
Dept: FAMILY MEDICINE | Facility: CLINIC | Age: 39
End: 2024-09-28
Payer: COMMERCIAL

## 2024-09-28 DIAGNOSIS — H60.391 INFECTIVE OTITIS EXTERNA, RIGHT: Primary | ICD-10-CM

## 2024-10-02 LAB — BACTERIA SPEC CULT: ABNORMAL

## 2024-12-12 DIAGNOSIS — I10 BENIGN ESSENTIAL HYPERTENSION: ICD-10-CM

## 2024-12-14 RX ORDER — LISINOPRIL 40 MG/1
40 TABLET ORAL DAILY
Qty: 45 TABLET | Refills: 0 | Status: SHIPPED | OUTPATIENT
Start: 2024-12-14

## 2024-12-14 NOTE — TELEPHONE ENCOUNTER
joaquín is due for a recheck. Have him make an appt to see me.for his annual physical and fasting blood work

## 2024-12-16 ENCOUNTER — OFFICE VISIT (OUTPATIENT)
Dept: OTOLARYNGOLOGY | Facility: CLINIC | Age: 39
End: 2024-12-16
Payer: COMMERCIAL

## 2024-12-16 ENCOUNTER — OFFICE VISIT (OUTPATIENT)
Dept: AUDIOLOGY | Facility: CLINIC | Age: 39
End: 2024-12-16
Payer: COMMERCIAL

## 2024-12-16 VITALS
SYSTOLIC BLOOD PRESSURE: 140 MMHG | OXYGEN SATURATION: 96 % | TEMPERATURE: 99.1 F | HEART RATE: 83 BPM | DIASTOLIC BLOOD PRESSURE: 96 MMHG

## 2024-12-16 DIAGNOSIS — H73.21 MYRINGITIS OF RIGHT EAR: Primary | ICD-10-CM

## 2024-12-16 DIAGNOSIS — H93.8X1 PLUGGED FEELING IN EAR, RIGHT: Primary | ICD-10-CM

## 2024-12-16 DIAGNOSIS — H60.391 INFECTIVE OTITIS EXTERNA, RIGHT: ICD-10-CM

## 2024-12-16 DIAGNOSIS — H73.21 MYRINGITIS OF RIGHT EAR: ICD-10-CM

## 2024-12-16 DIAGNOSIS — H91.91 DECREASED HEARING, RIGHT: ICD-10-CM

## 2024-12-16 PROCEDURE — 99203 OFFICE O/P NEW LOW 30 MIN: CPT

## 2024-12-16 RX ORDER — CIPROFLOXACIN AND DEXAMETHASONE 3; 1 MG/ML; MG/ML
SUSPENSION/ DROPS AURICULAR (OTIC)
Qty: 7.5 ML | Refills: 0 | Status: SHIPPED | OUTPATIENT
Start: 2024-12-16

## 2024-12-16 ASSESSMENT — PAIN SCALES - GENERAL: PAINLEVEL_OUTOF10: NO PAIN (0)

## 2024-12-16 NOTE — PATIENT INSTRUCTIONS
You were seen by Raphael Gutierrez CNP.  If you have questions or concerns regarding your appointment today, you can reach out to our call center at [phone].  The following has been recommended at your appointment today:    Schedule the audio test. You may have this done closer to home. I will reach out with results.   Start using the drops twice daily in the right ear for the next 7 days.   Reach out if you have any questions.

## 2024-12-16 NOTE — PROGRESS NOTES
AUDIOLOGY REPORT    SUBJECTIVE:  Noé Knight is a 39 year old male who was seen in the Audiology Clinic at the [facility] for audiologic evaluation, referred by Fabiana Mock M.D. .No previous audiograms are available at today's appointment.  The patient reports a 3 month history of a right fungal infection. He states it seems to be better but notes the right ear continues to itch and feel plugged. The patient feels he is hearing well. The patient denies  bilateral otalgia and bilateral drainage. They were accompanied today by their self.    OBJECTIVE:    Otoscopic exam indicates Right ear debris noted against the TM, left ear clear.    After medical evaluation of his ears hearing tested was deferred.       ASSESSMENT:   No diagnosis found.         PLAN:  It is recommended that the patient return for follow up hearing evaluation after treatment with ENT provider is completed.  Please call this clinic with questions regarding these results or recommendations.        Angella REAL, #[id number]

## 2024-12-16 NOTE — PROGRESS NOTES
Chief Complaint   Patient presents with    Ear Problem     Fungal ear infection for the past 3 months, Rt ear needs cleaning     History of Present Illness  Noé Knight is a 39 year old male who presents to today for ear evaluation. The patient was first seen back on 08/25/24 for bilateral impacted cerumen, which were cleaned out. Then, on 8/28/24 he was seen and treated for right acute otitis media with Amoxicillin.  On 09/04/24, more cerumen was removed and he was treated for otitis externa of the right ear with Ciprodex. On 09/13/24, it was determined he had a fungal infection and he was treated with Clotrimazole solution for 10 days. He sent a [employer] message on 09/28/24 feeling like he had a blockage in his right ear. Today, he is here to follow up and have his ears checked.     The patient tells me that his ears have felt better for the past couple of weeks. He feels like there is something in his right ear today. No vertigo or tinnitus. No troubles in the right ear.  The patient notes that he has been flushing his ear with water. He does have a history of ear infections as a child but no tube placement.     Past Medical History  Patient Active Problem List   Diagnosis    Hyperlipidemia LDL goal <160    Non morbid obesity, unspecified obesity type    Obesity (BMI 35.0-39.9) with comorbidity (H)    Alcohol abuse    Gastroesophageal reflux disease, esophagitis presence not specified    Lipoma of skin and subcutaneous tissue     Current Medications     Current Outpatient Medications:     lisinopril (ZESTRIL) 40 MG tablet, TAKE ONE TABLET BY MOUTH ONCE DAILY, Disp: 45 tablet, Rfl: 0    omeprazole (PRILOSEC) 20 MG DR capsule, TAKE ONE CAPSULE BY MOUTH ONCE DAILY, Disp: 90 capsule, Rfl: 3    Allergies  Allergies   Allergen Reactions    Seasonal Allergies        Social History   Social History     Socioeconomic History    Marital status: Single   Occupational History    Occupation: [employer]    Tobacco Use    Smoking status: Never     Passive exposure: Never    Smokeless tobacco: Never   Vaping Use    Vaping status: Never Used   Substance and Sexual Activity    Alcohol use: Yes     Alcohol/week: 0.0 standard drinks of alcohol     Comment: social use only    Drug use: No    Sexual activity: Yes     Partners: Female     Social Drivers of Health     Financial Resource Strain: Low Risk  (11/22/2023)    Financial Resource Strain     Within the past 12 months, have you or your family members you live with been unable to get utilities (heat, electricity) when it was really needed?: No   Food Insecurity: Low Risk  (11/22/2023)    Food Insecurity     Within the past 12 months, did you worry that your food would run out before you got money to buy more?: No     Within the past 12 months, did the food you bought just not last and you didn t have money to get more?: No   Transportation Needs: Low Risk  (11/22/2023)    Transportation Needs     Within the past 12 months, has lack of transportation kept you from medical appointments, getting your medicines, non-medical meetings or appointments, work, or from getting things that you need?: No    Received from [facility], [facility]    Social Connections   Interpersonal Safety: Low Risk  (9/13/2024)    Interpersonal Safety     Do you feel physically and emotionally safe where you currently live?: Yes     Within the past 12 months, have you been hit, slapped, kicked or otherwise physically hurt by someone?: No     Within the past 12 months, have you been humiliated or emotionally abused in other ways by your partner or ex-partner?: No   Housing Stability: Low Risk  (11/22/2023)    Housing Stability     Do you have housing? : Yes     Are you worried about losing your housing?: No       Family History  Family History   Problem Relation Age of Onset    Hypertension Father     Colon Cancer Maternal  Grandmother     Glaucoma No family hx of     Macular Degeneration No family hx of        Review of Systems  As per HPI and PMHx, otherwise 10+ comprehensive system review is negative.    Physical Exam  BP (!) 140/96 (BP Location: Right arm, Patient Position: Sitting, Cuff Size: Adult Large)   Pulse 83   Temp 99.1  F (37.3  C) (Tympanic)   SpO2 96%   GENERAL: Patient is a pleasant, cooperative 39 year old male in no acute distress.  HEAD: Normocephalic, atraumatic.  Hair and scalp are normal.  EYES: Pupils are equal, round, reactive to light and accommodation.  Extraocular movements are intact.  The sclera nonicteric without injection.  The extraocular structures are normal.  EARS: Normal shape and symmetry.  Right- TM appears to be red.   NEUROLOGIC: Cranial nerves II through XII are grossly intact.  Voice is strong.  Patient is House-Brackmann I/VI bilaterally.  CARDIOVASCULAR: Extremities are warm and well-perfused.  No significant peripheral edema.  RESPIRATORY: Patient has nonlabored breathing without cough, wheeze, stridor.  PSYCHIATRIC: Patient is alert and oriented.  Mood and affect appear normal.  SKIN: Warm and dry.  No scalp, face, or neck lesions noted.      Assessment and Plan     ICD-10-CM    1. Myringitis of right ear  H73.21 ciprofloxacin-dexAMETHasone (CIPRODEX) 0.3-0.1 % otic suspension     Adult Audiology  Referral      2. Decreased hearing, right  H91.91 Adult Audiology  Referral      3. Infective otitis externa, right  H60.391 Adult ENT  Referral        It was my pleasure seeing Noé YOLANDA Knight today in clinic.  Based on patient history and examination, the patient appears to have myringitis of the right ear. The fungal infection has cleared up. Therefore, will have him use the Cirpodex drops twice daily for the next 7 days. He will get a hearing test completed so we can determine his base line hearing and if he has any conductive hearing loss. If he has  conductive loss, we may need to obtain a CT temporal.     MEDHAT Morales [facility]  Otolaryngology  [place] [facility] [place]

## 2024-12-16 NOTE — NURSING NOTE
"Initial BP (!) 140/96 (BP Location: Right arm, Patient Position: Sitting, Cuff Size: Adult Large)   Pulse 83   Temp 99.1  F (37.3  C) (Tympanic)   SpO2 96%  Estimated body mass index is 43.15 kg/m  as calculated from the following:    Height as of 9/13/24: 1.83 m (6' 0.05\").    Weight as of 9/13/24: 144.5 kg (318 lb 9.6 oz). .  Xenia Keyes MA on 12/16/2024 at 9:10 AM    "

## 2024-12-16 NOTE — LETTER
12/16/2024      Noé Knight  [id number] [facility] [address]      Dear Colleague,    Thank you for referring your patient, Noé Knight, to the [facility]. Please see a copy of my visit note below.    Chief Complaint   Patient presents with     Ear Problem     Fungal ear infection for the past 3 months, Rt ear needs cleaning     History of Present Illness  Noé Knight is a 39 year old male who presents to today for ear evaluation. The patient was first seen back on 08/25/24 for bilateral impacted cerumen, which were cleaned out. Then, on 8/28/24 he was seen and treated for right acute otitis media with Amoxicillin.  On 09/04/24, more cerumen was removed and he was treated for otitis externa of the right ear with Ciprodex. On 09/13/24, it was determined he had a fungal infection and he was treated with Clotrimazole solution for 10 days. He sent a [employer] message on 09/28/24 feeling like he had a blockage in his right ear. Today, he is here to follow up and have his ears checked.     The patient tells me that his ears have felt better for the past couple of weeks. He feels like there is something in his right ear today. No vertigo or tinnitus. No troubles in the right ear.  The patient notes that he has been flushing his ear with water. He does have a history of ear infections as a child but no tube placement.     Past Medical History  Patient Active Problem List   Diagnosis     Hyperlipidemia LDL goal <160     Non morbid obesity, unspecified obesity type     Obesity (BMI 35.0-39.9) with comorbidity (H)     Alcohol abuse     Gastroesophageal reflux disease, esophagitis presence not specified     Lipoma of skin and subcutaneous tissue     Current Medications     Current Outpatient Medications:      lisinopril (ZESTRIL) 40 MG tablet, TAKE ONE TABLET BY MOUTH ONCE DAILY, Disp: 45 tablet, Rfl: 0     omeprazole (PRILOSEC) 20 MG DR capsule, TAKE ONE CAPSULE BY MOUTH ONCE DAILY,  Disp: 90 capsule, Rfl: 3    Allergies  Allergies   Allergen Reactions     Seasonal Allergies        Social History   Social History     Socioeconomic History     Marital status: Single   Occupational History     Occupation: [employer]   Tobacco Use     Smoking status: Never     Passive exposure: Never     Smokeless tobacco: Never   Vaping Use     Vaping status: Never Used   Substance and Sexual Activity     Alcohol use: Yes     Alcohol/week: 0.0 standard drinks of alcohol     Comment: social use only     Drug use: No     Sexual activity: Yes     Partners: Female     Social Drivers of Health     Financial Resource Strain: Low Risk  (11/22/2023)    Financial Resource Strain      Within the past 12 months, have you or your family members you live with been unable to get utilities (heat, electricity) when it was really needed?: No   Food Insecurity: Low Risk  (11/22/2023)    Food Insecurity      Within the past 12 months, did you worry that your food would run out before you got money to buy more?: No      Within the past 12 months, did the food you bought just not last and you didn t have money to get more?: No   Transportation Needs: Low Risk  (11/22/2023)    Transportation Needs      Within the past 12 months, has lack of transportation kept you from medical appointments, getting your medicines, non-medical meetings or appointments, work, or from getting things that you need?: No    Received from [facility], [facility]    Social Connections   Interpersonal Safety: Low Risk  (9/13/2024)    Interpersonal Safety      Do you feel physically and emotionally safe where you currently live?: Yes      Within the past 12 months, have you been hit, slapped, kicked or otherwise physically hurt by someone?: No      Within the past 12 months, have you been humiliated or emotionally abused in other ways by your partner or ex-partner?: No   Housing  Stability: Low Risk  (11/22/2023)    Housing Stability      Do you have housing? : Yes      Are you worried about losing your housing?: No       Family History  Family History   Problem Relation Age of Onset     Hypertension Father      Colon Cancer Maternal Grandmother      Glaucoma No family hx of      Macular Degeneration No family hx of        Review of Systems  As per HPI and PMHx, otherwise 10+ comprehensive system review is negative.    Physical Exam  BP (!) 140/96 (BP Location: Right arm, Patient Position: Sitting, Cuff Size: Adult Large)   Pulse 83   Temp 99.1  F (37.3  C) (Tympanic)   SpO2 96%   GENERAL: Patient is a pleasant, cooperative 39 year old male in no acute distress.  HEAD: Normocephalic, atraumatic.  Hair and scalp are normal.  EYES: Pupils are equal, round, reactive to light and accommodation.  Extraocular movements are intact.  The sclera nonicteric without injection.  The extraocular structures are normal.  EARS: Normal shape and symmetry.  Right- TM appears to be red.   NEUROLOGIC: Cranial nerves II through XII are grossly intact.  Voice is strong.  Patient is House-Brackmann I/VI bilaterally.  CARDIOVASCULAR: Extremities are warm and well-perfused.  No significant peripheral edema.  RESPIRATORY: Patient has nonlabored breathing without cough, wheeze, stridor.  PSYCHIATRIC: Patient is alert and oriented.  Mood and affect appear normal.  SKIN: Warm and dry.  No scalp, face, or neck lesions noted.      Assessment and Plan     ICD-10-CM    1. Myringitis of right ear  H73.21 ciprofloxacin-dexAMETHasone (CIPRODEX) 0.3-0.1 % otic suspension     Adult Audiology  Referral      2. Decreased hearing, right  H91.91 Adult Audiology  Referral      3. Infective otitis externa, right  H60.391 Adult ENT  Referral        It was my pleasure seeing Noé Knight today in clinic.  Based on patient history and examination, the patient appears to have myringitis of the right  ear. The fungal infection has cleared up. Therefore, will have him use the Cirpodex drops twice daily for the next 7 days. He will get a hearing test completed so we can determine his base line hearing and if he has any conductive hearing loss. If he has conductive loss, we may need to obtain a CT temporal.     MEDHAT Morales CNP  Otolaryngology  [place] [facility] [place]      Again, thank you for allowing me to participate in the care of your patient.        Sincerely,        MEDHAT Morales CNP

## 2024-12-16 NOTE — TELEPHONE ENCOUNTER
Contacted the patient and scheduled him for his annual physical/med check with Olivier on 12/24/2024.  [facility] Registration

## 2025-01-08 ENCOUNTER — OFFICE VISIT (OUTPATIENT)
Dept: FAMILY MEDICINE | Facility: CLINIC | Age: 40
End: 2025-01-08
Payer: COMMERCIAL

## 2025-01-08 VITALS
RESPIRATION RATE: 20 BRPM | DIASTOLIC BLOOD PRESSURE: 92 MMHG | HEIGHT: 72 IN | HEART RATE: 88 BPM | TEMPERATURE: 98 F | OXYGEN SATURATION: 96 % | SYSTOLIC BLOOD PRESSURE: 140 MMHG | WEIGHT: 315 LBS | BODY MASS INDEX: 42.66 KG/M2

## 2025-01-08 DIAGNOSIS — E66.01 MORBID OBESITY (H): ICD-10-CM

## 2025-01-08 DIAGNOSIS — I10 BENIGN ESSENTIAL HYPERTENSION: ICD-10-CM

## 2025-01-08 DIAGNOSIS — E78.5 HYPERLIPIDEMIA LDL GOAL <100: ICD-10-CM

## 2025-01-08 DIAGNOSIS — Z00.00 ROUTINE GENERAL MEDICAL EXAMINATION AT A HEALTH CARE FACILITY: Primary | ICD-10-CM

## 2025-01-08 LAB
ANION GAP SERPL CALCULATED.3IONS-SCNC: 8 MMOL/L (ref 7–15)
BUN SERPL-MCNC: 14.6 MG/DL (ref 6–20)
CALCIUM SERPL-MCNC: 9 MG/DL (ref 8.8–10.4)
CHLORIDE SERPL-SCNC: 102 MMOL/L (ref 98–107)
CHOLEST SERPL-MCNC: 245 MG/DL
CREAT SERPL-MCNC: 0.94 MG/DL (ref 0.67–1.17)
EGFRCR SERPLBLD CKD-EPI 2021: >90 ML/MIN/1.73M2
FASTING STATUS PATIENT QL REPORTED: YES
FASTING STATUS PATIENT QL REPORTED: YES
GLUCOSE SERPL-MCNC: 122 MG/DL (ref 70–99)
HCO3 SERPL-SCNC: 24 MMOL/L (ref 22–29)
HDLC SERPL-MCNC: 49 MG/DL
LDLC SERPL CALC-MCNC: 166 MG/DL
NONHDLC SERPL-MCNC: 196 MG/DL
POTASSIUM SERPL-SCNC: 4.2 MMOL/L (ref 3.4–5.3)
SODIUM SERPL-SCNC: 134 MMOL/L (ref 135–145)
TRIGL SERPL-MCNC: 149 MG/DL

## 2025-01-08 PROCEDURE — 80048 BASIC METABOLIC PNL TOTAL CA: CPT | Performed by: PHYSICIAN ASSISTANT

## 2025-01-08 PROCEDURE — 36415 COLL VENOUS BLD VENIPUNCTURE: CPT | Performed by: PHYSICIAN ASSISTANT

## 2025-01-08 PROCEDURE — 80061 LIPID PANEL: CPT | Performed by: PHYSICIAN ASSISTANT

## 2025-01-08 RX ORDER — AMLODIPINE AND BENAZEPRIL HYDROCHLORIDE 5; 20 MG/1; MG/1
1 CAPSULE ORAL DAILY
Qty: 60 CAPSULE | Refills: 0 | Status: SHIPPED | OUTPATIENT
Start: 2025-01-08

## 2025-01-08 SDOH — HEALTH STABILITY: PHYSICAL HEALTH: ON AVERAGE, HOW MANY DAYS PER WEEK DO YOU ENGAGE IN MODERATE TO STRENUOUS EXERCISE (LIKE A BRISK WALK)?: 2 DAYS

## 2025-01-08 SDOH — HEALTH STABILITY: PHYSICAL HEALTH: ON AVERAGE, HOW MANY MINUTES DO YOU ENGAGE IN EXERCISE AT THIS LEVEL?: 50 MIN

## 2025-01-08 ASSESSMENT — SOCIAL DETERMINANTS OF HEALTH (SDOH): HOW OFTEN DO YOU GET TOGETHER WITH FRIENDS OR RELATIVES?: THREE TIMES A WEEK

## 2025-01-08 ASSESSMENT — PAIN SCALES - GENERAL: PAINLEVEL_OUTOF10: NO PAIN (0)

## 2025-01-08 NOTE — PROGRESS NOTES
Preventive Care Visit  [facility] LISETH Shea PA-C, Family Medicine  Jan 8, 2025        Subjective   Dominick is a 39 year old, presenting for the following:  Physical        1/8/2025     7:31 AM   Additional Questions   Roomed by Sumi Johnston CMA   Accompanied by None         1/8/2025     7:31 AM   Patient Reported Additional Medications   Patient reports taking the following new medications None          HPI  Recheck of his htn.  No chest pain/sob/palpitations/dizziness/ha's  Recheck of obesity. Discussed a lower calorie diet and consistent mix of aerobic exercise and strength training. This will help maintain/treat his blood pressure.  Gerd has been controlled with lifestyle changes and his omeprazole.   Left knee pain with skating. No swelling. No locking or catching.     Health Care Directive  Patient does not have a Health Care Directive: Discussed advance care planning with patient; however, patient declined at this time.      1/8/2025   General Health   How would you rate your overall physical health? Good   Feel stress (tense, anxious, or unable to sleep) Not at all         1/8/2025   Nutrition   Three or more servings of calcium each day? Yes   Diet: Regular (no restrictions)   How many servings of fruit and vegetables per day? (!) 2-3   How many sweetened beverages each day? 0-1         1/8/2025   Exercise   Days per week of moderate/strenous exercise 2 days   Average minutes spent exercising at this level 50 min   (!) EXERCISE CONCERN      1/8/2025   Social Factors   Frequency of gathering with friends or relatives Three times a week   Worry food won't last until get money to buy more No   Food not last or not have enough money for food? No   Do you have housing? (Housing is defined as stable permanent housing and does not include staying ouside in a car, in a tent, in an abandoned building, in an overnight shelter, or couch-surfing.) Yes   Are you worried about losing your  housing? No   Lack of transportation? No   Unable to get utilities (heat,electricity)? No         1/8/2025   Dental   Dentist two times every year? Yes         1/8/2025   TB Screening   Were you born outside of the [place]? No         Today's PHQ-2 Score:       1/8/2025     7:29 AM   PHQ-2 ( 1999 [employer])   Q1: Little interest or pleasure in doing things 0   Q2: Feeling down, depressed or hopeless 0   PHQ-2 Score 0    Q1: Little interest or pleasure in doing things Not at all   Q2: Feeling down, depressed or hopeless Not at all   PHQ-2 Score 0       Patient-reported           1/8/2025   Substance Use   Alcohol more than 3/day or more than 7/wk Yes   How often do you have a drink containing alcohol 4 or more times a week   How many alcohol drinks on typical day 1 or 2   How often do you have 5+ drinks at one occasion Monthly   Audit 2/3 Score 2   How often not able to stop drinking once started Never   How often failed to do what normally expected Never   How often needed first drink in am after a heavy drinking session Never   How often feeling of guilt or remorse after drinking Less than monthly   How often unable to remember what happened the night before Never   Have you or someone else been injured because of your drinking No   Has anyone been concerned or suggested you cut down on drinking Yes, but not in the last year   TOTAL SCORE - AUDIT 9   Do you use any other substances recreationally? No     Social History     Tobacco Use    Smoking status: Never     Passive exposure: Never    Smokeless tobacco: Never   Vaping Use    Vaping status: Never Used   Substance Use Topics    Alcohol use: Yes     Alcohol/week: 0.0 standard drinks of alcohol     Comment: social use only    Drug use: No           1/8/2025   STI Screening   New sexual partner(s) since last STI/HIV test? No   ASCVD Risk   The 10-year ASCVD risk score (Atul MICHELE, et al., 2019) is: 2.2%    Values used to calculate the score:      Age: 39 years       Sex: Male      Is Non- : No      Diabetic: No      Tobacco smoker: No      Systolic Blood Pressure: 140 mmHg      Is BP treated: Yes      HDL Cholesterol: 36 mg/dL      Total Cholesterol: 188 mg/dL        1/8/2025   Contraception/Family Planning   Questions about contraception or family planning No        Reviewed and updated as needed this visit by Provider                    Past Medical History:   Diagnosis Date    Esophageal stricture     endoscopy with bougie dilatation    Hypertension     Seasonal allergies      Past Surgical History:   Procedure Laterality Date    OPEN REDUCTION INTERNAL FIXATION RODDING INTRAMEDULLAR FEMUR FRACTURE TABLE      wisdom teeth       BP Readings from Last 3 Encounters:   01/08/25 (!) 140/92   12/16/24 (!) 140/96   09/13/24 120/84    Wt Readings from Last 3 Encounters:   01/08/25 144.9 kg (319 lb 6 oz)   09/13/24 144.5 kg (318 lb 9.6 oz)   09/04/24 143.8 kg (317 lb)                  Patient Active Problem List   Diagnosis    Hyperlipidemia LDL goal <160    Non morbid obesity, unspecified obesity type    Obesity (BMI 35.0-39.9) with comorbidity (H)    Alcohol abuse    Gastroesophageal reflux disease, esophagitis presence not specified    Lipoma of skin and subcutaneous tissue     Past Surgical History:   Procedure Laterality Date    OPEN REDUCTION INTERNAL FIXATION RODDING INTRAMEDULLAR FEMUR FRACTURE TABLE      wisdom teeth         Social History     Tobacco Use    Smoking status: Never     Passive exposure: Never    Smokeless tobacco: Never   Substance Use Topics    Alcohol use: Yes     Alcohol/week: 0.0 standard drinks of alcohol     Comment: social use only     Family History   Problem Relation Age of Onset    Hypertension Father     Colon Cancer Maternal Grandmother     Glaucoma No family hx of     Macular Degeneration No family hx of          Current Outpatient Medications   Medication Sig Dispense Refill    lisinopril (ZESTRIL) 40 MG tablet TAKE ONE  TABLET BY MOUTH ONCE DAILY 45 tablet 0    omeprazole (PRILOSEC) 20 MG DR capsule TAKE ONE CAPSULE BY MOUTH ONCE DAILY 90 capsule 3     Allergies   Allergen Reactions    Seasonal Allergies      Recent Labs   Lab Test 03/08/24  0735 08/07/23  0943 07/13/23  0922 01/07/20  0808   A1C  --   --  5.1  --    *  --  152* 153*   HDL 36*  --  44 40   TRIG 131  --  162* 192*   ALT 51 39 60  --    CR  --  1.13 0.99  --    GFRESTIMATED  --  85 >90  --    POTASSIUM  --  4.9 4.9  --    TSH  --   --  0.71 0.89          Review of Systems  Constitutional, HEENT, cardiovascular, pulmonary, GI, , musculoskeletal, neuro, skin, endocrine and psych systems are negative, except as otherwise noted.     Objective    Exam  BP (!) 140/92   Pulse 88   Temp 98  F (36.7  C) (Oral)   Resp 20   Ht 1.829 m (6')   Wt 144.9 kg (319 lb 6 oz)   SpO2 96%   BMI 43.32 kg/m     Estimated body mass index is 43.32 kg/m  as calculated from the following:    Height as of this encounter: 1.829 m (6').    Weight as of this encounter: 144.9 kg (319 lb 6 oz).    Physical Exam  GENERAL: alert and no distress  EYES: Eyes grossly normal to inspection, PERRL and conjunctivae and sclerae normal  HENT: ear canals and TM's normal, nose and mouth without ulcers or lesions  NECK: no adenopathy, no asymmetry, masses, or scars  RESP: lungs clear to auscultation - no rales, rhonchi or wheezes  CV: regular rate and rhythm, normal S1 S2, no S3 or S4, no murmur, click or rub, no peripheral edema  ABDOMEN: soft, nontender, no hepatosplenomegaly, no masses and bowel sounds normal  MS: no gross musculoskeletal defects noted, no edema  SKIN: no suspicious lesions or rashes  NEURO: Normal strength and tone, mentation intact and speech normal  PSYCH: mentation appears normal, affect normal/bright    Dominick was seen today for physical.    Diagnoses and all orders for this visit:    Routine general medical examination at a health care facility    Benign essential  hypertension  -     BASIC METABOLIC PANEL; Future  -     amLODIPine-benazepril (LOTREL) 5-20 MG capsule; Take 1 capsule by mouth daily.    Morbid obesity (H)    Hyperlipidemia LDL goal <100  -     Lipid panel reflex to direct LDL Fasting; Future    Other orders  -     REVIEW OF HEALTH MAINTENANCE PROTOCOL ORDERS  -     INFLUENZA VACCINE,SPLIT VIRUS,TRIVALENT,PF(FLUZONE)  -     COVID-19 12+ (PFIZER)      Discontinue lisinopril.  Recheck blood pressure in 2 mos  Lower fat, higher fiber diet and consistent exercise.      Signed Electronically by: Olivier Shea PA-C

## 2025-03-19 ENCOUNTER — OFFICE VISIT (OUTPATIENT)
Dept: AUDIOLOGY | Facility: OTHER | Age: 40
End: 2025-03-19
Payer: COMMERCIAL

## 2025-03-19 ENCOUNTER — OFFICE VISIT (OUTPATIENT)
Dept: OTOLARYNGOLOGY | Facility: OTHER | Age: 40
End: 2025-03-19
Payer: COMMERCIAL

## 2025-03-19 ENCOUNTER — OFFICE VISIT (OUTPATIENT)
Dept: FAMILY MEDICINE | Facility: CLINIC | Age: 40
End: 2025-03-19
Payer: COMMERCIAL

## 2025-03-19 ENCOUNTER — TELEPHONE (OUTPATIENT)
Dept: OTOLARYNGOLOGY | Facility: CLINIC | Age: 40
End: 2025-03-19

## 2025-03-19 VITALS
SYSTOLIC BLOOD PRESSURE: 124 MMHG | WEIGHT: 315 LBS | TEMPERATURE: 97.8 F | BODY MASS INDEX: 42.66 KG/M2 | DIASTOLIC BLOOD PRESSURE: 96 MMHG | HEART RATE: 88 BPM | OXYGEN SATURATION: 98 % | RESPIRATION RATE: 20 BRPM | HEIGHT: 72 IN

## 2025-03-19 VITALS
TEMPERATURE: 97.7 F | SYSTOLIC BLOOD PRESSURE: 124 MMHG | HEIGHT: 72 IN | WEIGHT: 315 LBS | BODY MASS INDEX: 42.66 KG/M2 | DIASTOLIC BLOOD PRESSURE: 84 MMHG

## 2025-03-19 DIAGNOSIS — I10 BENIGN ESSENTIAL HYPERTENSION: Primary | ICD-10-CM

## 2025-03-19 DIAGNOSIS — H93.8X1 PLUGGED FEELING IN EAR, RIGHT: ICD-10-CM

## 2025-03-19 DIAGNOSIS — R73.01 IMPAIRED FASTING GLUCOSE: ICD-10-CM

## 2025-03-19 DIAGNOSIS — H60.391 INFECTIVE OTITIS EXTERNA, RIGHT: ICD-10-CM

## 2025-03-19 DIAGNOSIS — H66.91: Primary | ICD-10-CM

## 2025-03-19 DIAGNOSIS — H90.11 CONDUCTIVE HEARING LOSS OF RIGHT EAR WITH UNRESTRICTED HEARING OF LEFT EAR: Primary | ICD-10-CM

## 2025-03-19 LAB
EST. AVERAGE GLUCOSE BLD GHB EST-MCNC: 105 MG/DL
HBA1C MFR BLD: 5.3 % (ref 0–5.6)

## 2025-03-19 PROCEDURE — 99213 OFFICE O/P EST LOW 20 MIN: CPT | Performed by: PHYSICIAN ASSISTANT

## 2025-03-19 PROCEDURE — 3074F SYST BP LT 130 MM HG: CPT | Performed by: OTOLARYNGOLOGY

## 2025-03-19 PROCEDURE — 92567 TYMPANOMETRY: CPT

## 2025-03-19 PROCEDURE — 3074F SYST BP LT 130 MM HG: CPT | Performed by: PHYSICIAN ASSISTANT

## 2025-03-19 PROCEDURE — 3079F DIAST BP 80-89 MM HG: CPT | Performed by: PHYSICIAN ASSISTANT

## 2025-03-19 PROCEDURE — 1126F AMNT PAIN NOTED NONE PRSNT: CPT | Performed by: PHYSICIAN ASSISTANT

## 2025-03-19 PROCEDURE — 92557 COMPREHENSIVE HEARING TEST: CPT

## 2025-03-19 PROCEDURE — 3079F DIAST BP 80-89 MM HG: CPT | Performed by: OTOLARYNGOLOGY

## 2025-03-19 PROCEDURE — 83036 HEMOGLOBIN GLYCOSYLATED A1C: CPT | Performed by: PHYSICIAN ASSISTANT

## 2025-03-19 PROCEDURE — 99213 OFFICE O/P EST LOW 20 MIN: CPT | Performed by: OTOLARYNGOLOGY

## 2025-03-19 PROCEDURE — 36415 COLL VENOUS BLD VENIPUNCTURE: CPT | Performed by: PHYSICIAN ASSISTANT

## 2025-03-19 RX ORDER — CLOTRIMAZOLE 1 G/ML
SOLUTION TOPICAL 2 TIMES DAILY
Qty: 10 ML | Refills: 1 | Status: SHIPPED | OUTPATIENT
Start: 2025-03-19

## 2025-03-19 RX ORDER — AMLODIPINE AND BENAZEPRIL HYDROCHLORIDE 5; 40 MG/1; MG/1
1 CAPSULE ORAL DAILY
Qty: 90 CAPSULE | Refills: 0 | Status: SHIPPED | OUTPATIENT
Start: 2025-03-19

## 2025-03-19 ASSESSMENT — PAIN SCALES - GENERAL: PAINLEVEL_OUTOF10: NO PAIN (0)

## 2025-03-19 NOTE — LETTER
"3/19/2025      Noé Knight  [address] [facility] [address]      Dear Colleague,    Thank you for referring your patient, Noé Knight, to the [facility]. Please see a copy of my visit note below.    ENT Consultation    Noé Knight who is a 39 year old male seen in consultation at the request of Olivier Shea PA-C.      History of Present Illness - Noé Knight is a 39 year old male presents for evaluation of problems with the right ear.  Started in August 2024 when the ear felt \"plugged\".  He had it cleaned aggressively and since then was diagnosed with ear infection.  He was sent oral antibiotics and then drops.  However did not improve.  Apparently culture was positive for fungus.  Patient is otherwise healthy without history of diabetes.  Whenever he comes bit of fungal debris he feels better here better than it regulates.  Denies any tinnitus denies any vertigo.  Denies any prior history of otologic disease.  Had few infections as a child.        BP Readings from Last 1 Encounters:   03/19/25 124/84       BP noted to be well controlled today in office.     Noé IS NOT a smoker/uses chewing tobacco.      Past Medical History -   Past Medical History:   Diagnosis Date     Esophageal stricture     endoscopy with bougie dilatation     Hypertension      Seasonal allergies        Current Medications -   Current Outpatient Medications:      amLODIPine-benazepril (LOTREL) 5-40 MG capsule, Take 1 capsule by mouth daily., Disp: 90 capsule, Rfl: 0     omeprazole (PRILOSEC) 20 MG DR capsule, TAKE ONE CAPSULE BY MOUTH ONCE DAILY, Disp: 90 capsule, Rfl: 3    Allergies -   Allergies   Allergen Reactions     Seasonal Allergies        Social History -   Social History     Socioeconomic History     Marital status: Single   Occupational History     Occupation: [employer]   Tobacco Use     Smoking status: Never     Passive exposure: Never     Smokeless tobacco: " Never   Vaping Use     Vaping status: Never Used   Substance and Sexual Activity     Alcohol use: Yes     Alcohol/week: 0.0 standard drinks of alcohol     Comment: social use only     Drug use: No     Sexual activity: Yes     Partners: Female     Social Drivers of Health     Financial Resource Strain: Low Risk  (1/8/2025)    Financial Resource Strain      Within the past 12 months, have you or your family members you live with been unable to get utilities (heat, electricity) when it was really needed?: No   Food Insecurity: Low Risk  (1/8/2025)    Food Insecurity      Within the past 12 months, did you worry that your food would run out before you got money to buy more?: No      Within the past 12 months, did the food you bought just not last and you didn t have money to get more?: No   Transportation Needs: Low Risk  (1/8/2025)    Transportation Needs      Within the past 12 months, has lack of transportation kept you from medical appointments, getting your medicines, non-medical meetings or appointments, work, or from getting things that you need?: No   Physical Activity: Insufficiently Active (1/8/2025)    Exercise Vital Sign      Days of Exercise per Week: 2 days      Minutes of Exercise per Session: 50 min   Stress: No Stress Concern Present (1/8/2025)    [employer] - Occupational Stress Questionnaire      Feeling of Stress : Not at all   Social Connections: Unknown (1/8/2025)    Social Connection and Isolation Panel [NHANES]      Frequency of Social Gatherings with Friends and Family: Three times a week   Interpersonal Safety: Low Risk  (1/8/2025)    Interpersonal Safety      Do you feel physically and emotionally safe where you currently live?: Yes      Within the past 12 months, have you been hit, slapped, kicked or otherwise physically hurt by someone?: No      Within the past 12 months, have you been humiliated or emotionally abused in other ways by your partner or  ex-partner?: No   Housing Stability: Low Risk  (1/8/2025)    Housing Stability      Do you have housing? : Yes      Are you worried about losing your housing?: No       Family History -   Family History   Problem Relation Age of Onset     Hypertension Father      Colon Cancer Maternal Grandmother      Glaucoma No family hx of      Macular Degeneration No family hx of        Review of Systems - As per HPI and PMHx, otherwise review of system review of the head and neck negative. Otherwise 10+ review of system is negative    Physical Exam  /84   Temp 97.7  F (36.5  C) (Temporal)   Ht 1.829 m (6')   Wt (!) 144.4 kg (318 lb 6 oz)   BMI 43.18 kg/m    BMI: Body mass index is 43.18 kg/m .    General - The patient is well nourished and well developed, and appears to have good nutritional status.  Alert and oriented to person and place, answers questions and cooperates with examination appropriately.    SKIN - No suspicious lesions or rashes.  Respiration - No respiratory distress.  Head and Face - Normocephalic and atraumatic, with no gross asymmetry noted of the contour of the facial features.  The facial nerve is intact, with strong symmetric movements.    Voice and Breathing - The patient was breathing comfortably without the use of accessory muscles. The patients voice was clear and strong, and had appropriate pitch and quality.    Ears - Bilateral pinna and EACs with normal appearing overlying skin.  Left tympanic membrane intact with good mobility on pneumatic otoscopy . Bony landmarks of the ossicular chain are normal. The tympanic membrane is normal in appearance. No retraction, perforation, or masses.  No fluid or purulence was seen in the external canal or the middle ear.   On the right side after suctioning some fungal debris I was able to appreciate posteriorly superiorly and area of discrete inflammation some ulceration and fullness which may suggest middle ear process.  Eyes - Extraocular movements  intact.  Sclera were not icteric or injected, conjunctiva were pink and moist.    Mouth - Examination of the oral cavity showed pink, healthy oral mucosa. No lesions or ulcerations noted.  The tongue was mobile and midline, and the dentition were in good condition.      Throat - The walls of the oropharynx were smooth, pink, moist, symmetric, and had no lesions or ulcerations.  The tonsillar pillars and soft palate were symmetric.  The uvula was midline on elevation.    Neck - Normal midline excursion of the laryngotracheal complex during swallowing.  Full range of motion on passive movement.  Palpation of the occipital, submental, submandibular, internal jugular chain, and supraclavicular nodes did not demonstrate any abnormal lymph nodes or masses.  The carotid pulse was palpable bilaterally.  Palpation of the thyroid was soft and smooth, with no nodules or goiter appreciated.  The trachea was mobile and midline.    Nose - External contour is symmetric, no gross deflection or scars.  Nasal mucosa is pink and moist with no abnormal mucus.  The septum was midline and non-obstructive, turbinates of normal size and position.  No polyps, masses, or purulence noted on examination.    Neuro - Nonfocal neuro exam is normal, CN 2 through 12 intact, normal gait and muscle tone.      Performed in clinic today:  Audiologic Studies - An audiogram and tympanogram were performed today as part of the evaluation and personally reviewed. The tympanogram shows Type A rounded curves on the right and Type A curves on the left, with normal canal volumes and middle ear pressures.  The audiogram showed very mild conductive loss on the right and normal pure-tone thresholds on the left.    Word recognition score 100% bilaterally.    A/P - Noé YOLANDA Knight is a 39 year old male patient with what appears to be a fungal otitis externa but also inflammation specifically that may be involving not only the drum but middle ear space behind it.   Therefore CT of the temporal bones will be performed.  In the meantime clotrimazole drops will be used by the patient.  Patient will return in a few weeks.      Maxx Stallings MD       Again, thank you for allowing me to participate in the care of your patient.        Sincerely,        Maxx Stallings MD, MD    Electronically signed

## 2025-03-19 NOTE — TELEPHONE ENCOUNTER
[facility] Call Center    Phone Message    May a detailed message be left on voicemail: yes     Reason for Call: Other: Sending to [place] to notify of appointment schedule for 3/19 @ 2pm audiology, and 245 with Dr Stallings     Action Taken: Other: ENT    Travel Screening: Not Applicable     Date of Service:

## 2025-03-19 NOTE — PROGRESS NOTES
Subjective   Dominick is a 39 year old, presenting for the following health issues:  Hypertension        3/19/2025     8:17 AM   Additional Questions   Roomed by Sumi Johnston CMA   Accompanied by None     History of Present Illness       Hypertension: He presents for follow up of hypertension.  He does check blood pressure  regularly outside of the clinic. Outside blood pressures have been over 140/90. He does not follow a low salt diet.     He eats 2-3 servings of fruits and vegetables daily.He consumes 1 sweetened beverage(s) daily.He exercises with enough effort to increase his heart rate 30 to 60 minutes per day.  He exercises with enough effort to increase his heart rate 3 or less days per week.   He is taking medications regularly.      No chest pain/sob/palpitations/dizziness/ha's  Recent fasting glucose was a little elevated  Cholesterol was up as well.  Discussed a lower fat/sugar/starch diet and more exercise.     Review of Systems  Constitutional, HEENT, cardiovascular, pulmonary, GI, , musculoskeletal, neuro, skin, endocrine and psych systems are negative, except as otherwise noted.      Objective    BP (!) 124/96   Pulse 88   Temp 97.8  F (36.6  C) (Temporal)   Resp 20   Ht 1.829 m (6')   Wt (!) 144.4 kg (318 lb 6 oz)   SpO2 98%   BMI 43.18 kg/m    Body mass index is 43.18 kg/m .  Physical Exam   Eye exam - right eye normal lid, conjunctiva, cornea, pupil and fundus, left eye normal lid, conjunctiva, cornea, pupil and fundus.  Thyroid not palpable, not enlarged, no nodules detected.  CHEST:chest clear to IPPA, no tachypnea, retractions or cyanosis, and S1, S2 normal, no murmur, no gallop, rate regular.    Dominick was seen today for hypertension.    Diagnoses and all orders for this visit:    Benign essential hypertension  -     amLODIPine-benazepril (LOTREL) 5-40 MG capsule; Take 1 capsule by mouth daily.  -     Basic metabolic panel  (Ca, Cl, CO2, Creat, Gluc, K, Na, BUN); Future    Impaired  fasting glucose  -     Hemoglobin A1c; Future      Inc benazepril component from 20 to 40 mg daily.  Lower fat, higher fiber diet and consistent exercise.  Lower sodium diet  Continue to work on a lower sugar/starch diet and more exercise.  Recheck in 2-3 mos   Signed Electronically by: Olivier Shea PA-C

## 2025-03-19 NOTE — PROGRESS NOTES
"ENT Consultation    Noé Knight who is a 39 year old male seen in consultation at the request of Olivier Shea PA-C.      History of Present Illness - Noé Knight is a 39 year old male presents for evaluation of problems with the right ear.  Started in August 2024 when the ear felt \"plugged\".  He had it cleaned aggressively and since then was diagnosed with ear infection.  He was sent oral antibiotics and then drops.  However did not improve.  Apparently culture was positive for fungus.  Patient is otherwise healthy without history of diabetes.  Whenever he comes bit of fungal debris he feels better here better than it regulates.  Denies any tinnitus denies any vertigo.  Denies any prior history of otologic disease.  Had few infections as a child.        BP Readings from Last 1 Encounters:   03/19/25 124/84       BP noted to be well controlled today in office.     Noé IS NOT a smoker/uses chewing tobacco.      Past Medical History -   Past Medical History:   Diagnosis Date    Esophageal stricture     endoscopy with bougie dilatation    Hypertension     Seasonal allergies        Current Medications -   Current Outpatient Medications:     amLODIPine-benazepril (LOTREL) 5-40 MG capsule, Take 1 capsule by mouth daily., Disp: 90 capsule, Rfl: 0    omeprazole (PRILOSEC) 20 MG DR capsule, TAKE ONE CAPSULE BY MOUTH ONCE DAILY, Disp: 90 capsule, Rfl: 3    Allergies -   Allergies   Allergen Reactions    Seasonal Allergies        Social History -   Social History     Socioeconomic History    Marital status: Single   Occupational History    Occupation: [employer]   Tobacco Use    Smoking status: Never     Passive exposure: Never    Smokeless tobacco: Never   Vaping Use    Vaping status: Never Used   Substance and Sexual Activity    Alcohol use: Yes     Alcohol/week: 0.0 standard drinks of alcohol     Comment: social use only    Drug use: No    Sexual activity: Yes     Partners: Female     Social " Drivers of Health     Financial Resource Strain: Low Risk  (1/8/2025)    Financial Resource Strain     Within the past 12 months, have you or your family members you live with been unable to get utilities (heat, electricity) when it was really needed?: No   Food Insecurity: Low Risk  (1/8/2025)    Food Insecurity     Within the past 12 months, did you worry that your food would run out before you got money to buy more?: No     Within the past 12 months, did the food you bought just not last and you didn t have money to get more?: No   Transportation Needs: Low Risk  (1/8/2025)    Transportation Needs     Within the past 12 months, has lack of transportation kept you from medical appointments, getting your medicines, non-medical meetings or appointments, work, or from getting things that you need?: No   Physical Activity: Insufficiently Active (1/8/2025)    Exercise Vital Sign     Days of Exercise per Week: 2 days     Minutes of Exercise per Session: 50 min   Stress: No Stress Concern Present (1/8/2025)    [employer] [place] [employer] - Occupational Stress Questionnaire     Feeling of Stress : Not at all   Social Connections: Unknown (1/8/2025)    Social Connection and Isolation Panel [NHANES]     Frequency of Social Gatherings with Friends and Family: Three times a week   Interpersonal Safety: Low Risk  (1/8/2025)    Interpersonal Safety     Do you feel physically and emotionally safe where you currently live?: Yes     Within the past 12 months, have you been hit, slapped, kicked or otherwise physically hurt by someone?: No     Within the past 12 months, have you been humiliated or emotionally abused in other ways by your partner or ex-partner?: No   Housing Stability: Low Risk  (1/8/2025)    Housing Stability     Do you have housing? : Yes     Are you worried about losing your housing?: No       Family History -   Family History   Problem Relation Age of Onset    Hypertension Father     Colon Cancer  Maternal Grandmother     Glaucoma No family hx of     Macular Degeneration No family hx of        Review of Systems - As per HPI and PMHx, otherwise review of system review of the head and neck negative. Otherwise 10+ review of system is negative    Physical Exam  /84   Temp 97.7  F (36.5  C) (Temporal)   Ht 1.829 m (6')   Wt (!) 144.4 kg (318 lb 6 oz)   BMI 43.18 kg/m    BMI: Body mass index is 43.18 kg/m .    General - The patient is well nourished and well developed, and appears to have good nutritional status.  Alert and oriented to person and place, answers questions and cooperates with examination appropriately.    SKIN - No suspicious lesions or rashes.  Respiration - No respiratory distress.  Head and Face - Normocephalic and atraumatic, with no gross asymmetry noted of the contour of the facial features.  The facial nerve is intact, with strong symmetric movements.    Voice and Breathing - The patient was breathing comfortably without the use of accessory muscles. The patients voice was clear and strong, and had appropriate pitch and quality.    Ears - Bilateral pinna and EACs with normal appearing overlying skin.  Left tympanic membrane intact with good mobility on pneumatic otoscopy . Bony landmarks of the ossicular chain are normal. The tympanic membrane is normal in appearance. No retraction, perforation, or masses.  No fluid or purulence was seen in the external canal or the middle ear.   On the right side after suctioning some fungal debris I was able to appreciate posteriorly superiorly and area of discrete inflammation some ulceration and fullness which may suggest middle ear process.  Eyes - Extraocular movements intact.  Sclera were not icteric or injected, conjunctiva were pink and moist.    Mouth - Examination of the oral cavity showed pink, healthy oral mucosa. No lesions or ulcerations noted.  The tongue was mobile and midline, and the dentition were in good condition.      Throat -  The walls of the oropharynx were smooth, pink, moist, symmetric, and had no lesions or ulcerations.  The tonsillar pillars and soft palate were symmetric.  The uvula was midline on elevation.    Neck - Normal midline excursion of the laryngotracheal complex during swallowing.  Full range of motion on passive movement.  Palpation of the occipital, submental, submandibular, internal jugular chain, and supraclavicular nodes did not demonstrate any abnormal lymph nodes or masses.  The carotid pulse was palpable bilaterally.  Palpation of the thyroid was soft and smooth, with no nodules or goiter appreciated.  The trachea was mobile and midline.    Nose - External contour is symmetric, no gross deflection or scars.  Nasal mucosa is pink and moist with no abnormal mucus.  The septum was midline and non-obstructive, turbinates of normal size and position.  No polyps, masses, or purulence noted on examination.    Neuro - Nonfocal neuro exam is normal, CN 2 through 12 intact, normal gait and muscle tone.      Performed in clinic today:  Audiologic Studies - An audiogram and tympanogram were performed today as part of the evaluation and personally reviewed. The tympanogram shows Type A rounded curves on the right and Type A curves on the left, with normal canal volumes and middle ear pressures.  The audiogram showed very mild conductive loss on the right and normal pure-tone thresholds on the left.    Word recognition score 100% bilaterally.    A/P - Noé Knight is a 39 year old male patient with what appears to be a fungal otitis externa but also inflammation specifically that may be involving not only the drum but middle ear space behind it.  Therefore CT of the temporal bones will be performed.  In the meantime clotrimazole drops will be used by the patient.  Patient will return in a few weeks.      Maxx Stallings MD

## 2025-03-19 NOTE — PROGRESS NOTES
AUDIOLOGY REPORT    SUBJECTIVE:  Noé Knight is a 39 year old male who was seen in the Audiology Clinic at the [facility] for audiologic evaluation, referred by MEDHAT Morales CNP. The patient has been seen previously at a [facility] clinic on 12/16/2024 for assessment and results indicated debris in the right ear canal and testing was deferred.     The patient reports in August 2024 he needed to have wax clean out of his right ear. The patient noticed about a week later some right ear pain that was diagnosed as a ear infection possibly related to yeast. Patient reports he occasionally notices some pain behind his right ear. The patient reports he feels his right ear gradually plugs up and increases in pressure throughout a few days which cues him to clean his ears. The patient reports he has had a few short episodes of imbalance. The patient reports ear infections in childhood but did not require tubes. The patient reports he wears hearing protection for work. The patient was unaccompanied at today's appointment.    OBJECTIVE:  Otoscopy:  Otoscopic exam indicates some white debris in right ear canal, and clear ear canal left.    Tympanogram:    RIGHT: normal eardrum mobility    LEFT:   normal eardrum mobility    Thresholds:   Pure Tone Thresholds assessed using conventional audiometry with good  reliability from 250-8000 Hz bilaterally using insert earphones and circumaural headphones     RIGHT:  normal sloping to moderate conductive hearing loss    LEFT:    normal hearing sensitivity    Speech Reception Threshold:    RIGHT: 5 dB HL    LEFT:   20 dB HL    Word Recognition Score:     RIGHT: 100% at 60 dB HL using NU-6 recorded word list.    LEFT:   100% at 50 dB HL using NU-6 recorded word list.    ASSESSMENT: Today's testing revealed a conductive hearing loss  for the right ear and normal hearing sensitivity for the left ear.  Today s results were discussed with the patient  in detail.     PLAN: Follow up with ENT.      Florecita Ornelas, CCC-A  Doctor of Audiology, [place] #[id number]   March 19, 2025

## 2025-03-20 LAB
ANION GAP SERPL CALCULATED.3IONS-SCNC: 10 MMOL/L (ref 7–15)
BUN SERPL-MCNC: 9.1 MG/DL (ref 6–20)
CALCIUM SERPL-MCNC: 9.3 MG/DL (ref 8.8–10.4)
CHLORIDE SERPL-SCNC: 101 MMOL/L (ref 98–107)
CREAT SERPL-MCNC: 0.93 MG/DL (ref 0.67–1.17)
EGFRCR SERPLBLD CKD-EPI 2021: >90 ML/MIN/1.73M2
GLUCOSE SERPL-MCNC: 124 MG/DL (ref 70–99)
HCO3 SERPL-SCNC: 26 MMOL/L (ref 22–29)
POTASSIUM SERPL-SCNC: 5.2 MMOL/L (ref 3.4–5.3)
SODIUM SERPL-SCNC: 137 MMOL/L (ref 135–145)

## 2025-04-10 NOTE — PROGRESS NOTES
History of Present Illness - Noé Knight is a 39 year old male presenting in clinic today for a recheck on Patient presents with:  Follow Up    Patient with history of a fungal otitis externa involving his right ear.  Currently feels much better after having used clotrimazole drops for the last couple of weeks.  Still has them.  Still has little discomfort below the ear in the upper neck but much improved.    BP Readings from Last 1 Encounters:   04/21/25 128/60       BP noted to be well controlled today in office.     Noé IS NOT a smoker/uses chewing tobacco.        Past Medical History -   Past Medical History:   Diagnosis Date    Esophageal stricture     endoscopy with bougie dilatation    Hypertension     Seasonal allergies        Current Medications -   Current Outpatient Medications:     amLODIPine-benazepril (LOTREL) 5-40 MG capsule, Take 1 capsule by mouth daily., Disp: 90 capsule, Rfl: 0    clotrimazole (LOTRIMIN) 1 % external solution, Apply topically 2 times daily. 3 drops in the right ear twice daily, Disp: 10 mL, Rfl: 1    omeprazole (PRILOSEC) 20 MG DR capsule, TAKE ONE CAPSULE BY MOUTH ONCE DAILY, Disp: 90 capsule, Rfl: 3    Allergies -   Allergies   Allergen Reactions    Seasonal Allergies        Social History -   Social History     Socioeconomic History    Marital status: Single   Occupational History    Occupation: [employer]   Tobacco Use    Smoking status: Never     Passive exposure: Never    Smokeless tobacco: Never   Vaping Use    Vaping status: Never Used   Substance and Sexual Activity    Alcohol use: Yes     Alcohol/week: 0.0 standard drinks of alcohol     Comment: social use only    Drug use: No    Sexual activity: Yes     Partners: Female     Social Drivers of Health     Financial Resource Strain: Low Risk  (1/8/2025)    Financial Resource Strain     Within the past 12 months, have you or your family members you live with been unable to get utilities (heat,  electricity) when it was really needed?: No   Food Insecurity: Low Risk  (1/8/2025)    Food Insecurity     Within the past 12 months, did you worry that your food would run out before you got money to buy more?: No     Within the past 12 months, did the food you bought just not last and you didn t have money to get more?: No   Transportation Needs: Low Risk  (1/8/2025)    Transportation Needs     Within the past 12 months, has lack of transportation kept you from medical appointments, getting your medicines, non-medical meetings or appointments, work, or from getting things that you need?: No   Physical Activity: Insufficiently Active (1/8/2025)    Exercise Vital Sign     Days of Exercise per Week: 2 days     Minutes of Exercise per Session: 50 min   Stress: No Stress Concern Present (1/8/2025)    [employer] [place] [employer] - Occupational Stress Questionnaire     Feeling of Stress : Not at all   Social Connections: Unknown (1/8/2025)    Social Connection and Isolation Panel [NHANES]     Frequency of Social Gatherings with Friends and Family: Three times a week   Interpersonal Safety: Low Risk  (1/8/2025)    Interpersonal Safety     Do you feel physically and emotionally safe where you currently live?: Yes     Within the past 12 months, have you been hit, slapped, kicked or otherwise physically hurt by someone?: No     Within the past 12 months, have you been humiliated or emotionally abused in other ways by your partner or ex-partner?: No   Housing Stability: Low Risk  (1/8/2025)    Housing Stability     Do you have housing? : Yes     Are you worried about losing your housing?: No       Family History -   Family History   Problem Relation Age of Onset    Hypertension Father     Colon Cancer Maternal Grandmother     Glaucoma No family hx of     Macular Degeneration No family hx of        Review of Systems - As per HPI and PMHx, otherwise review of system review of the head and neck negative. Otherwise  10+ review of system is negative    Physical Exam  /60   Temp 97.5  F (36.4  C) (Temporal)   Ht 1.829 m (6')   Wt (!) 144.8 kg (319 lb 4.8 oz)   BMI 43.30 kg/m    BMI: Body mass index is 43.3 kg/m .    General - The patient is well nourished and well developed, and appears to have good nutritional status.  Alert and oriented to person and place, answers questions and cooperates with examination appropriately.    SKIN - No suspicious lesions or rashes.  Respiration - No respiratory distress.  Head and Face - Normocephalic and atraumatic, with no gross asymmetry noted of the contour of the facial features.  The facial nerve is intact, with strong symmetric movements.    Voice and Breathing - The patient was breathing comfortably without the use of accessory muscles. The patients voice was clear and strong, and had appropriate pitch and quality.    Ears - Bilateral pinna and EACs with normal appearing overlying skin. Tympanic membrane intact with good mobility on pneumatic otoscopy bilaterally. Bony landmarks of the ossicular chain are normal. The tympanic membranes are normal in appearance with slight thickening of the right drum but completely mobile. No retraction, perforation, or masses.  No fluid or purulence was seen in the external canal or the middle ear.     Eyes - Extraocular movements intact.  Sclera were not icteric or injected, conjunctiva were pink and moist.      Neck - Normal midline excursion of the laryngotracheal complex during swallowing.  Full range of motion on passive movement.  Palpation of the occipital, submental, submandibular, internal jugular chain, and supraclavicular nodes did not demonstrate any abnormal lymph nodes or masses.  The carotid pulse was palpable bilaterally.  Palpation of the thyroid was soft and smooth, with no nodules or goiter appreciated.  The trachea was mobile and midline.  Minimal tenderness still elicited on the right side at the level  sternocleidomastoid.    Nose - External contour is symmetric, no gross deflection or scars.  Nasal mucosa is pink and moist with no abnormal mucus.  The septum was midline and non-obstructive, turbinates of normal size and position.  No polyps, masses, or purulence noted on examination.    Neuro - Nonfocal neuro exam is normal, CN 2 through 12 intact, normal gait and muscle tone.          A/P - Noé Knight is a 39 year old male Patient presents with:  Follow Up    Patient with resolving otitis externa that is fungal.  He will finish the drops for another week.  He will apply local heat and use ibuprofen as needed for right cervicalgia neck muscle tension pain.    Noé should follow up as needed.            Maxx Stallings MD

## 2025-04-21 ENCOUNTER — OFFICE VISIT (OUTPATIENT)
Dept: OTOLARYNGOLOGY | Facility: CLINIC | Age: 40
End: 2025-04-21
Payer: COMMERCIAL

## 2025-04-21 VITALS
BODY MASS INDEX: 42.66 KG/M2 | TEMPERATURE: 97.5 F | SYSTOLIC BLOOD PRESSURE: 128 MMHG | HEIGHT: 72 IN | WEIGHT: 315 LBS | DIASTOLIC BLOOD PRESSURE: 60 MMHG

## 2025-04-21 DIAGNOSIS — B36.9 OTITIS EXTERNA, FUNGAL, RIGHT EAR: Primary | ICD-10-CM

## 2025-04-21 DIAGNOSIS — H62.41 OTITIS EXTERNA, FUNGAL, RIGHT EAR: Primary | ICD-10-CM

## 2025-04-21 PROCEDURE — 3074F SYST BP LT 130 MM HG: CPT | Performed by: OTOLARYNGOLOGY

## 2025-04-21 PROCEDURE — 3078F DIAST BP <80 MM HG: CPT | Performed by: OTOLARYNGOLOGY

## 2025-04-21 PROCEDURE — 99213 OFFICE O/P EST LOW 20 MIN: CPT | Performed by: OTOLARYNGOLOGY

## 2025-04-21 NOTE — LETTER
4/21/2025      Noé Knight  [id number] [facility] [address]      Dear Colleague,    Thank you for referring your patient, Noé Knight, to the [facility]. Please see a copy of my visit note below.    History of Present Illness - Noé Knight is a 39 year old male presenting in clinic today for a recheck on Patient presents with:  Follow Up    Patient with history of a fungal otitis externa involving his right ear.  Currently feels much better after having used clotrimazole drops for the last couple of weeks.  Still has them.  Still has little discomfort below the ear in the upper neck but much improved.    BP Readings from Last 1 Encounters:   04/21/25 128/60       BP noted to be well controlled today in office.     Noé IS NOT a smoker/uses chewing tobacco.        Past Medical History -   Past Medical History:   Diagnosis Date     Esophageal stricture     endoscopy with bougie dilatation     Hypertension      Seasonal allergies        Current Medications -   Current Outpatient Medications:      amLODIPine-benazepril (LOTREL) 5-40 MG capsule, Take 1 capsule by mouth daily., Disp: 90 capsule, Rfl: 0     clotrimazole (LOTRIMIN) 1 % external solution, Apply topically 2 times daily. 3 drops in the right ear twice daily, Disp: 10 mL, Rfl: 1     omeprazole (PRILOSEC) 20 MG DR capsule, TAKE ONE CAPSULE BY MOUTH ONCE DAILY, Disp: 90 capsule, Rfl: 3    Allergies -   Allergies   Allergen Reactions     Seasonal Allergies        Social History -   Social History     Socioeconomic History     Marital status: Single   Occupational History     Occupation: [employer]   Tobacco Use     Smoking status: Never     Passive exposure: Never     Smokeless tobacco: Never   Vaping Use     Vaping status: Never Used   Substance and Sexual Activity     Alcohol use: Yes     Alcohol/week: 0.0 standard drinks of alcohol     Comment: social use only     Drug use: No     Sexual  activity: Yes     Partners: Female     Social Drivers of Health     Financial Resource Strain: Low Risk  (1/8/2025)    Financial Resource Strain      Within the past 12 months, have you or your family members you live with been unable to get utilities (heat, electricity) when it was really needed?: No   Food Insecurity: Low Risk  (1/8/2025)    Food Insecurity      Within the past 12 months, did you worry that your food would run out before you got money to buy more?: No      Within the past 12 months, did the food you bought just not last and you didn t have money to get more?: No   Transportation Needs: Low Risk  (1/8/2025)    Transportation Needs      Within the past 12 months, has lack of transportation kept you from medical appointments, getting your medicines, non-medical meetings or appointments, work, or from getting things that you need?: No   Physical Activity: Insufficiently Active (1/8/2025)    Exercise Vital Sign      Days of Exercise per Week: 2 days      Minutes of Exercise per Session: 50 min   Stress: No Stress Concern Present (1/8/2025)    [place] [employer] - Occupational Stress Questionnaire      Feeling of Stress : Not at all   Social Connections: Unknown (1/8/2025)    Social Connection and Isolation Panel [NHANES]      Frequency of Social Gatherings with Friends and Family: Three times a week   Interpersonal Safety: Low Risk  (1/8/2025)    Interpersonal Safety      Do you feel physically and emotionally safe where you currently live?: Yes      Within the past 12 months, have you been hit, slapped, kicked or otherwise physically hurt by someone?: No      Within the past 12 months, have you been humiliated or emotionally abused in other ways by your partner or ex-partner?: No   Housing Stability: Low Risk  (1/8/2025)    Housing Stability      Do you have housing? : Yes      Are you worried about losing your housing?: No       Family History -   Family History   Problem  Relation Age of Onset     Hypertension Father      Colon Cancer Maternal Grandmother      Glaucoma No family hx of      Macular Degeneration No family hx of        Review of Systems - As per HPI and PMHx, otherwise review of system review of the head and neck negative. Otherwise 10+ review of system is negative    Physical Exam  /60   Temp 97.5  F (36.4  C) (Temporal)   Ht 1.829 m (6')   Wt (!) 144.8 kg (319 lb 4.8 oz)   BMI 43.30 kg/m    BMI: Body mass index is 43.3 kg/m .    General - The patient is well nourished and well developed, and appears to have good nutritional status.  Alert and oriented to person and place, answers questions and cooperates with examination appropriately.    SKIN - No suspicious lesions or rashes.  Respiration - No respiratory distress.  Head and Face - Normocephalic and atraumatic, with no gross asymmetry noted of the contour of the facial features.  The facial nerve is intact, with strong symmetric movements.    Voice and Breathing - The patient was breathing comfortably without the use of accessory muscles. The patients voice was clear and strong, and had appropriate pitch and quality.    Ears - Bilateral pinna and EACs with normal appearing overlying skin. Tympanic membrane intact with good mobility on pneumatic otoscopy bilaterally. Bony landmarks of the ossicular chain are normal. The tympanic membranes are normal in appearance with slight thickening of the right drum but completely mobile. No retraction, perforation, or masses.  No fluid or purulence was seen in the external canal or the middle ear.     Eyes - Extraocular movements intact.  Sclera were not icteric or injected, conjunctiva were pink and moist.      Neck - Normal midline excursion of the laryngotracheal complex during swallowing.  Full range of motion on passive movement.  Palpation of the occipital, submental, submandibular, internal jugular chain, and supraclavicular nodes did not demonstrate any  abnormal lymph nodes or masses.  The carotid pulse was palpable bilaterally.  Palpation of the thyroid was soft and smooth, with no nodules or goiter appreciated.  The trachea was mobile and midline.  Minimal tenderness still elicited on the right side at the level sternocleidomastoid.    Nose - External contour is symmetric, no gross deflection or scars.  Nasal mucosa is pink and moist with no abnormal mucus.  The septum was midline and non-obstructive, turbinates of normal size and position.  No polyps, masses, or purulence noted on examination.    Neuro - Nonfocal neuro exam is normal, CN 2 through 12 intact, normal gait and muscle tone.          A/P - Noé Knight is a 39 year old male Patient presents with:  Follow Up    Patient with resolving otitis externa that is fungal.  He will finish the drops for another week.  He will apply local heat and use ibuprofen as needed for right cervicalgia neck muscle tension pain.    Noé should follow up as needed.            Maxx Stallings MD           Again, thank you for allowing me to participate in the care of your patient.        Sincerely,        Maxx Stallings MD, MD    Electronically signed

## 2025-06-07 ENCOUNTER — RESULTS FOLLOW-UP (OUTPATIENT)
Dept: URGENT CARE | Facility: URGENT CARE | Age: 40
End: 2025-06-07

## 2025-06-07 ENCOUNTER — OFFICE VISIT (OUTPATIENT)
Dept: URGENT CARE | Facility: URGENT CARE | Age: 40
End: 2025-06-07
Payer: COMMERCIAL

## 2025-06-07 VITALS
DIASTOLIC BLOOD PRESSURE: 80 MMHG | OXYGEN SATURATION: 97 % | TEMPERATURE: 97 F | WEIGHT: 315 LBS | HEIGHT: 70 IN | RESPIRATION RATE: 16 BRPM | SYSTOLIC BLOOD PRESSURE: 139 MMHG | BODY MASS INDEX: 45.1 KG/M2 | HEART RATE: 68 BPM

## 2025-06-07 DIAGNOSIS — J02.9 SORE THROAT: Primary | ICD-10-CM

## 2025-06-07 DIAGNOSIS — J01.00 ACUTE NON-RECURRENT MAXILLARY SINUSITIS: ICD-10-CM

## 2025-06-07 LAB
DEPRECATED S PYO AG THROAT QL EIA: NEGATIVE
S PYO DNA THROAT QL NAA+PROBE: NOT DETECTED

## 2025-06-07 PROCEDURE — 99213 OFFICE O/P EST LOW 20 MIN: CPT | Performed by: PHYSICIAN ASSISTANT

## 2025-06-07 PROCEDURE — 3075F SYST BP GE 130 - 139MM HG: CPT | Performed by: PHYSICIAN ASSISTANT

## 2025-06-07 PROCEDURE — 87651 STREP A DNA AMP PROBE: CPT | Performed by: PHYSICIAN ASSISTANT

## 2025-06-07 PROCEDURE — 1126F AMNT PAIN NOTED NONE PRSNT: CPT | Performed by: PHYSICIAN ASSISTANT

## 2025-06-07 PROCEDURE — 3079F DIAST BP 80-89 MM HG: CPT | Performed by: PHYSICIAN ASSISTANT

## 2025-06-07 RX ORDER — AMOXICILLIN 500 MG/1
500 CAPSULE ORAL 3 TIMES DAILY
Qty: 30 CAPSULE | Refills: 0 | Status: SHIPPED | OUTPATIENT
Start: 2025-06-07 | End: 2025-06-17

## 2025-06-07 ASSESSMENT — PAIN SCALES - GENERAL: PAINLEVEL_OUTOF10: NO PAIN (0)

## 2025-06-07 NOTE — PROGRESS NOTES
"Urgent Care Clinic Visit    Chief Complaint   Patient presents with    Urgent Care    Pharyngitis     X's 2 weeks no fever or runny nose                6/7/2025     1:10 PM   Additional Questions   Roomed by ca   Accompanied by self     No  Does the patient have a sore throat and either history of fever >100.4 in the previous 24 hours without a cough or recent exposure to a known case of strep throat? Yes       Assessment & Plan     Sore throat  X 2 weeks, has post nasal drainage with productive cough with clear phlegm  - Streptococcus A Rapid Screen w/Reflex to PCR - Clinic Collect  - Group A Streptococcus PCR Throat Swab    Acute non-recurrent maxillary sinusitis  Follow-up if symptoms persist or worsen  - amoxicillin (AMOXIL) 500 MG capsule  Dispense: 30 capsule; Refill: 0             No follow-ups on file.    Jeannette Fletcher PA-C  [facility]     Dominick is a 39 year old male who presents to clinic today for the following health issues:  Chief Complaint   Patient presents with    Urgent Care    Pharyngitis     X's 2 weeks no fever or runny nose          6/7/2025     1:10 PM   Additional Questions   Roomed by ca   Accompanied by self     HPI    URI Adult    Onset of symptoms was 2 week(s) ago.  Course of illness is same.    Severity moderate  Current and Associated symptoms: cough - productive, sore throat, and hoarse voice  Treatment measures tried include Antihistamine.  Predisposing factors include None.      Review of Systems  Constitutional, HEENT, cardiovascular, pulmonary, gi and gu systems are negative, except as otherwise noted.      Objective    /80   Pulse 68   Temp 97  F (36.1  C) (Tympanic)   Resp 16   Ht 1.778 m (5' 10\")   Wt (!) 143.8 kg (317 lb)   SpO2 97%   BMI 45.48 kg/m    Physical Exam   GENERAL: alert and no distress  EYES: Eyes grossly normal to inspection, PERRL and conjunctivae and sclerae normal  HENT: ear canals and TM's normal, nose and " mouth without ulcers or lesions  NECK: no adenopathy, no asymmetry, masses, or scars  RESP: lungs clear to auscultation - no rales, rhonchi or wheezes  CV: regular rate and rhythm, normal S1 S2, no S3 or S4, no murmur, click or rub, no peripheral edema  ABDOMEN: soft, nontender, no hepatosplenomegaly, no masses and bowel sounds normal  MS: no gross musculoskeletal defects noted, no edema  SKIN: no suspicious lesions or rashes  NEURO: Normal strength and tone, mentation intact and speech normal  PSYCH: mentation appears normal, affect normal/bright           no hematuria/no nausea/no fever/no abdominal distension/no diarrhea/no blood in stool/no burning urination/no chills/no dysuria/no vomiting

## 2025-06-19 ENCOUNTER — MYC REFILL (OUTPATIENT)
Dept: FAMILY MEDICINE | Facility: CLINIC | Age: 40
End: 2025-06-19
Payer: COMMERCIAL

## 2025-06-19 DIAGNOSIS — I10 BENIGN ESSENTIAL HYPERTENSION: ICD-10-CM

## 2025-06-19 RX ORDER — AMLODIPINE AND BENAZEPRIL HYDROCHLORIDE 5; 40 MG/1; MG/1
1 CAPSULE ORAL DAILY
Qty: 90 CAPSULE | Refills: 0 | Status: CANCELLED | OUTPATIENT
Start: 2025-06-19

## 2025-06-19 NOTE — TELEPHONE ENCOUNTER
Progress Note on 3/19/25 states: Recheck in 2-3 mos     MyChart message sent to patient.     Kristel Mata RN BSN  [facility]

## 2025-06-20 ENCOUNTER — MYC MEDICAL ADVICE (OUTPATIENT)
Dept: FAMILY MEDICINE | Facility: CLINIC | Age: 40
End: 2025-06-20
Payer: COMMERCIAL

## 2025-06-20 DIAGNOSIS — I10 BENIGN ESSENTIAL HYPERTENSION: ICD-10-CM

## 2025-06-21 RX ORDER — AMLODIPINE AND BENAZEPRIL HYDROCHLORIDE 5; 40 MG/1; MG/1
1 CAPSULE ORAL DAILY
Qty: 30 CAPSULE | Refills: 0 | Status: SHIPPED | OUTPATIENT
Start: 2025-06-21 | End: 2025-06-26

## 2025-06-21 RX ORDER — AMLODIPINE AND BENAZEPRIL HYDROCHLORIDE 5; 40 MG/1; MG/1
1 CAPSULE ORAL DAILY
Qty: 90 CAPSULE | Refills: 0 | Status: SHIPPED | OUTPATIENT
Start: 2025-06-21 | End: 2025-06-21

## 2025-06-26 ENCOUNTER — OFFICE VISIT (OUTPATIENT)
Dept: FAMILY MEDICINE | Facility: CLINIC | Age: 40
End: 2025-06-26
Payer: COMMERCIAL

## 2025-06-26 VITALS
HEART RATE: 82 BPM | BODY MASS INDEX: 44.1 KG/M2 | DIASTOLIC BLOOD PRESSURE: 92 MMHG | OXYGEN SATURATION: 96 % | RESPIRATION RATE: 20 BRPM | WEIGHT: 315 LBS | HEIGHT: 71 IN | SYSTOLIC BLOOD PRESSURE: 130 MMHG | TEMPERATURE: 98.4 F

## 2025-06-26 DIAGNOSIS — J30.1 SEASONAL ALLERGIC RHINITIS DUE TO POLLEN: Primary | ICD-10-CM

## 2025-06-26 DIAGNOSIS — I10 BENIGN ESSENTIAL HYPERTENSION: ICD-10-CM

## 2025-06-26 RX ORDER — FLUTICASONE PROPIONATE 50 MCG
1 SPRAY, SUSPENSION (ML) NASAL DAILY
Qty: 16 G | Refills: 3 | Status: SHIPPED | OUTPATIENT
Start: 2025-06-26

## 2025-06-26 RX ORDER — LORATADINE 10 MG/1
10 TABLET ORAL DAILY
Qty: 90 TABLET | Refills: 1 | Status: SHIPPED | OUTPATIENT
Start: 2025-06-26

## 2025-06-26 RX ORDER — AMLODIPINE AND BENAZEPRIL HYDROCHLORIDE 10; 40 MG/1; MG/1
1 CAPSULE ORAL DAILY
Qty: 90 CAPSULE | Refills: 0 | Status: SHIPPED | OUTPATIENT
Start: 2025-06-26

## 2025-06-26 NOTE — PROGRESS NOTES
"    Subjective   Dominick is a 39 year old, presenting for the following health issues:  Hypertension        6/26/2025    10:58 AM   Additional Questions   Roomed by Davina Day CMA   Accompanied by None         6/26/2025    10:58 AM   Patient Reported Additional Medications   Patient reports taking the following new medications none     History of Present Illness       Hypertension: He presents for follow up of hypertension.  He does not check blood pressure  regularly outside of the clinic. Outside blood pressures have been over 140/90. He does not follow a low salt diet.     He eats 2-3 servings of fruits and vegetables daily.He consumes 1 sweetened beverage(s) daily.He exercises with enough effort to increase his heart rate 30 to 60 minutes per day.  He exercises with enough effort to increase his heart rate 3 or less days per week.   He is taking medications regularly.      No chest pain/sob/palpitations/dizziness/ha's    Ongoing seasonal flare of his allergies/nasal congestion and pnd.       Review of Systems  Constitutional, HEENT, cardiovascular, pulmonary, GI, , musculoskeletal, neuro, skin, endocrine and psych systems are negative, except as otherwise noted.      Objective    BP (!) 130/92   Pulse 82   Temp 98.4  F (36.9  C) (Oral)   Resp 20   Ht 1.81 m (5' 11.25\")   Wt (!) 145.5 kg (320 lb 12.8 oz)   SpO2 96%   BMI 44.43 kg/m    Body mass index is 44.43 kg/m .  Physical Exam   ENT exam reveals - bilateral TM normal without fluid or infection, neck without nodes, throat normal without erythema or exudate, post nasal drip noted, nasal mucosa congested, and nasal mucosa pale and congested.  Thyroid not palpable, not enlarged, no nodules detected.  CHEST:chest clear to IPPA, no tachypnea, retractions or cyanosis, and S1, S2 normal, no murmur, no gallop, rate regular.      Dominick was seen today for hypertension.    Diagnoses and all orders for this visit:    Seasonal allergic rhinitis due to pollen  -     " loratadine (CLARITIN) 10 MG tablet; Take 1 tablet (10 mg) by mouth daily.  -     fluticasone (FLONASE) 50 MCG/ACT nasal spray; Spray 1 spray into both nostrils daily.    Benign essential hypertension  -     amLODIPine-benazepril (LOTREL) 10-40 MG capsule; Take 1 capsule by mouth daily.      Advised supportive and symptomatic treatment.  Follow up with Provider - if condition persists or worsens.   Lower sodium diet.  Lower fat, higher fiber diet and consistent exercise.      Signed Electronically by: Olivier Shea PA-C

## 2025-08-14 ENCOUNTER — OFFICE VISIT (OUTPATIENT)
Dept: FAMILY MEDICINE | Facility: CLINIC | Age: 40
End: 2025-08-14
Payer: COMMERCIAL

## 2025-08-14 VITALS
SYSTOLIC BLOOD PRESSURE: 158 MMHG | RESPIRATION RATE: 20 BRPM | DIASTOLIC BLOOD PRESSURE: 96 MMHG | OXYGEN SATURATION: 96 % | HEIGHT: 72 IN | HEART RATE: 78 BPM | WEIGHT: 315 LBS | TEMPERATURE: 98.5 F | BODY MASS INDEX: 42.66 KG/M2

## 2025-08-14 DIAGNOSIS — K21.9 GASTROESOPHAGEAL REFLUX DISEASE WITHOUT ESOPHAGITIS: ICD-10-CM

## 2025-08-14 DIAGNOSIS — I10 BENIGN ESSENTIAL HYPERTENSION: Primary | ICD-10-CM

## 2025-08-14 RX ORDER — AMLODIPINE AND BENAZEPRIL HYDROCHLORIDE 5; 40 MG/1; MG/1
1 CAPSULE ORAL DAILY
Qty: 90 CAPSULE | Refills: 1 | Status: SHIPPED | OUTPATIENT
Start: 2025-08-14

## 2025-08-14 RX ORDER — SPIRONOLACTONE AND HYDROCHLOROTHIAZIDE 25; 25 MG/1; MG/1
1 TABLET ORAL DAILY
Qty: 45 TABLET | Refills: 0 | Status: SHIPPED | OUTPATIENT
Start: 2025-08-14

## 2025-08-14 RX ORDER — OMEPRAZOLE 20 MG/1
20 CAPSULE, DELAYED RELEASE ORAL DAILY
Qty: 90 CAPSULE | Refills: 3 | Status: SHIPPED | OUTPATIENT
Start: 2025-08-14

## 2025-08-14 RX ORDER — AMLODIPINE AND BENAZEPRIL HYDROCHLORIDE 10; 40 MG/1; MG/1
1 CAPSULE ORAL DAILY
Qty: 90 CAPSULE | Refills: 0 | Status: SHIPPED | OUTPATIENT
Start: 2025-08-14